# Patient Record
Sex: MALE | Race: WHITE | Employment: UNEMPLOYED | URBAN - METROPOLITAN AREA
[De-identification: names, ages, dates, MRNs, and addresses within clinical notes are randomized per-mention and may not be internally consistent; named-entity substitution may affect disease eponyms.]

---

## 2022-02-10 ENCOUNTER — APPOINTMENT (OUTPATIENT)
Dept: CT IMAGING | Age: 62
End: 2022-02-10
Attending: PHYSICIAN ASSISTANT
Payer: MEDICARE

## 2022-02-10 ENCOUNTER — HOSPITAL ENCOUNTER (EMERGENCY)
Age: 62
Discharge: ANOTHER HOSPITAL | End: 2022-02-10
Attending: STUDENT IN AN ORGANIZED HEALTH CARE EDUCATION/TRAINING PROGRAM
Payer: MEDICARE

## 2022-02-10 ENCOUNTER — APPOINTMENT (OUTPATIENT)
Dept: CT IMAGING | Age: 62
End: 2022-02-10
Attending: STUDENT IN AN ORGANIZED HEALTH CARE EDUCATION/TRAINING PROGRAM
Payer: MEDICARE

## 2022-02-10 ENCOUNTER — APPOINTMENT (OUTPATIENT)
Dept: GENERAL RADIOLOGY | Age: 62
End: 2022-02-10
Attending: STUDENT IN AN ORGANIZED HEALTH CARE EDUCATION/TRAINING PROGRAM
Payer: MEDICARE

## 2022-02-10 VITALS
RESPIRATION RATE: 19 BRPM | SYSTOLIC BLOOD PRESSURE: 81 MMHG | HEIGHT: 73 IN | DIASTOLIC BLOOD PRESSURE: 48 MMHG | BODY MASS INDEX: 23.71 KG/M2 | HEART RATE: 76 BPM | TEMPERATURE: 98.7 F | WEIGHT: 178.9 LBS | OXYGEN SATURATION: 99 %

## 2022-02-10 DIAGNOSIS — G40.901 STATUS EPILEPTICUS (HCC): Primary | ICD-10-CM

## 2022-02-10 DIAGNOSIS — R41.82 ALTERED MENTAL STATUS, UNSPECIFIED ALTERED MENTAL STATUS TYPE: ICD-10-CM

## 2022-02-10 DIAGNOSIS — N17.9 AKI (ACUTE KIDNEY INJURY) (HCC): ICD-10-CM

## 2022-02-10 LAB
AMPHET UR QL SCN: NEGATIVE
ANION GAP SERPL CALC-SCNC: 15 MMOL/L (ref 3–18)
APPEARANCE UR: CLEAR
APTT PPP: 24.5 SEC (ref 23–36.4)
ATRIAL RATE: 137 BPM
BACTERIA URNS QL MICRO: NEGATIVE /HPF
BARBITURATES UR QL SCN: NEGATIVE
BASOPHILS # BLD: 0 K/UL (ref 0–0.1)
BASOPHILS NFR BLD: 0 % (ref 0–2)
BENZODIAZ UR QL: NEGATIVE
BILIRUB UR QL: NEGATIVE
BUN SERPL-MCNC: 23 MG/DL (ref 7–18)
BUN/CREAT SERPL: 15 (ref 12–20)
CALCIUM SERPL-MCNC: 9.4 MG/DL (ref 8.5–10.1)
CALCULATED P AXIS, ECG09: 52 DEGREES
CALCULATED R AXIS, ECG10: 22 DEGREES
CALCULATED T AXIS, ECG11: 58 DEGREES
CANNABINOIDS UR QL SCN: NEGATIVE
CHLORIDE SERPL-SCNC: 107 MMOL/L (ref 100–111)
CO2 SERPL-SCNC: 19 MMOL/L (ref 21–32)
COCAINE UR QL SCN: NEGATIVE
COLOR UR: YELLOW
CREAT SERPL-MCNC: 1.57 MG/DL (ref 0.6–1.3)
DIAGNOSIS, 93000: NORMAL
DIFFERENTIAL METHOD BLD: ABNORMAL
EOSINOPHIL # BLD: 0.1 K/UL (ref 0–0.4)
EOSINOPHIL NFR BLD: 1 % (ref 0–5)
EPITH CASTS URNS QL MICRO: NORMAL /LPF (ref 0–5)
ERYTHROCYTE [DISTWIDTH] IN BLOOD BY AUTOMATED COUNT: 13.2 % (ref 11.6–14.5)
GLUCOSE BLD STRIP.AUTO-MCNC: 154 MG/DL (ref 70–110)
GLUCOSE SERPL-MCNC: 180 MG/DL (ref 74–99)
GLUCOSE UR STRIP.AUTO-MCNC: NEGATIVE MG/DL
HCT VFR BLD AUTO: 46.4 % (ref 36–48)
HDSCOM,HDSCOM: NORMAL
HGB BLD-MCNC: 14.9 G/DL (ref 13–16)
HGB UR QL STRIP: NEGATIVE
IMM GRANULOCYTES # BLD AUTO: 0.1 K/UL (ref 0–0.04)
IMM GRANULOCYTES NFR BLD AUTO: 1 % (ref 0–0.5)
INR PPP: 1.1 (ref 0.8–1.2)
KETONES UR QL STRIP.AUTO: 15 MG/DL
LEUKOCYTE ESTERASE UR QL STRIP.AUTO: NEGATIVE
LYMPHOCYTES # BLD: 2.6 K/UL (ref 0.9–3.6)
LYMPHOCYTES NFR BLD: 25 % (ref 21–52)
MCH RBC QN AUTO: 32.5 PG (ref 24–34)
MCHC RBC AUTO-ENTMCNC: 32.1 G/DL (ref 31–37)
MCV RBC AUTO: 101.3 FL (ref 78–100)
METHADONE UR QL: NEGATIVE
MONOCYTES # BLD: 0.8 K/UL (ref 0.05–1.2)
MONOCYTES NFR BLD: 8 % (ref 3–10)
NEUTS SEG # BLD: 6.5 K/UL (ref 1.8–8)
NEUTS SEG NFR BLD: 65 % (ref 40–73)
NITRITE UR QL STRIP.AUTO: NEGATIVE
NRBC # BLD: 0 K/UL (ref 0–0.01)
NRBC BLD-RTO: 0 PER 100 WBC
OPIATES UR QL: NEGATIVE
P-R INTERVAL, ECG05: 138 MS
PCP UR QL: NEGATIVE
PH UR STRIP: 5.5 [PH] (ref 5–8)
PLATELET # BLD AUTO: 169 K/UL (ref 135–420)
PMV BLD AUTO: 10 FL (ref 9.2–11.8)
POTASSIUM SERPL-SCNC: 3.5 MMOL/L (ref 3.5–5.5)
PROT UR STRIP-MCNC: ABNORMAL MG/DL
PROTHROMBIN TIME: 14 SEC (ref 11.5–15.2)
Q-T INTERVAL, ECG07: 288 MS
QRS DURATION, ECG06: 74 MS
QTC CALCULATION (BEZET), ECG08: 434 MS
RBC # BLD AUTO: 4.58 M/UL (ref 4.35–5.65)
RBC #/AREA URNS HPF: NORMAL /HPF (ref 0–5)
SODIUM SERPL-SCNC: 141 MMOL/L (ref 136–145)
SP GR UR REFRACTOMETRY: 1.03 (ref 1–1.03)
UROBILINOGEN UR QL STRIP.AUTO: 1 EU/DL (ref 0.2–1)
VENTRICULAR RATE, ECG03: 137 BPM
WBC # BLD AUTO: 10 K/UL (ref 4.6–13.2)
WBC URNS QL MICRO: NORMAL /HPF (ref 0–5)

## 2022-02-10 PROCEDURE — 70450 CT HEAD/BRAIN W/O DYE: CPT

## 2022-02-10 PROCEDURE — 96367 TX/PROPH/DG ADDL SEQ IV INF: CPT

## 2022-02-10 PROCEDURE — 93005 ELECTROCARDIOGRAM TRACING: CPT

## 2022-02-10 PROCEDURE — 85730 THROMBOPLASTIN TIME PARTIAL: CPT

## 2022-02-10 PROCEDURE — 74011000250 HC RX REV CODE- 250: Performed by: EMERGENCY MEDICINE

## 2022-02-10 PROCEDURE — 74011000636 HC RX REV CODE- 636: Performed by: STUDENT IN AN ORGANIZED HEALTH CARE EDUCATION/TRAINING PROGRAM

## 2022-02-10 PROCEDURE — 82962 GLUCOSE BLOOD TEST: CPT

## 2022-02-10 PROCEDURE — 71045 X-RAY EXAM CHEST 1 VIEW: CPT

## 2022-02-10 PROCEDURE — 74011000250 HC RX REV CODE- 250: Performed by: STUDENT IN AN ORGANIZED HEALTH CARE EDUCATION/TRAINING PROGRAM

## 2022-02-10 PROCEDURE — 96375 TX/PRO/DX INJ NEW DRUG ADDON: CPT

## 2022-02-10 PROCEDURE — 96365 THER/PROPH/DIAG IV INF INIT: CPT

## 2022-02-10 PROCEDURE — 96376 TX/PRO/DX INJ SAME DRUG ADON: CPT

## 2022-02-10 PROCEDURE — 96368 THER/DIAG CONCURRENT INF: CPT

## 2022-02-10 PROCEDURE — 80048 BASIC METABOLIC PNL TOTAL CA: CPT

## 2022-02-10 PROCEDURE — 85610 PROTHROMBIN TIME: CPT

## 2022-02-10 PROCEDURE — 74011250636 HC RX REV CODE- 250/636

## 2022-02-10 PROCEDURE — 80307 DRUG TEST PRSMV CHEM ANLYZR: CPT

## 2022-02-10 PROCEDURE — 99285 EMERGENCY DEPT VISIT HI MDM: CPT

## 2022-02-10 PROCEDURE — 74011000258 HC RX REV CODE- 258: Performed by: STUDENT IN AN ORGANIZED HEALTH CARE EDUCATION/TRAINING PROGRAM

## 2022-02-10 PROCEDURE — 74011250636 HC RX REV CODE- 250/636: Performed by: STUDENT IN AN ORGANIZED HEALTH CARE EDUCATION/TRAINING PROGRAM

## 2022-02-10 PROCEDURE — 74011250636 HC RX REV CODE- 250/636: Performed by: EMERGENCY MEDICINE

## 2022-02-10 PROCEDURE — 81001 URINALYSIS AUTO W/SCOPE: CPT

## 2022-02-10 PROCEDURE — 74011000258 HC RX REV CODE- 258: Performed by: EMERGENCY MEDICINE

## 2022-02-10 PROCEDURE — 70496 CT ANGIOGRAPHY HEAD: CPT

## 2022-02-10 PROCEDURE — 96366 THER/PROPH/DIAG IV INF ADDON: CPT

## 2022-02-10 PROCEDURE — 85025 COMPLETE CBC W/AUTO DIFF WBC: CPT

## 2022-02-10 RX ORDER — LORAZEPAM 2 MG/ML
4 INJECTION INTRAMUSCULAR ONCE
Status: COMPLETED | OUTPATIENT
Start: 2022-02-10 | End: 2022-02-10

## 2022-02-10 RX ORDER — LORAZEPAM 2 MG/ML
INJECTION INTRAMUSCULAR
Status: DISCONTINUED
Start: 2022-02-10 | End: 2022-02-11 | Stop reason: HOSPADM

## 2022-02-10 RX ORDER — LORAZEPAM 2 MG/ML
1 INJECTION INTRAMUSCULAR ONCE
Status: COMPLETED | OUTPATIENT
Start: 2022-02-10 | End: 2022-02-10

## 2022-02-10 RX ORDER — VALPROATE SODIUM 100 MG/ML
20 INJECTION INTRAVENOUS
Status: DISCONTINUED | OUTPATIENT
Start: 2022-02-10 | End: 2022-02-11 | Stop reason: HOSPADM

## 2022-02-10 RX ORDER — RIVAROXABAN 15 MG/1
15 TABLET, FILM COATED ORAL
COMMUNITY
Start: 2022-02-02

## 2022-02-10 RX ORDER — LORAZEPAM 2 MG/ML
INJECTION INTRAMUSCULAR
Status: COMPLETED
Start: 2022-02-10 | End: 2022-02-10

## 2022-02-10 RX ORDER — TAMSULOSIN HYDROCHLORIDE 0.4 MG/1
0.4 CAPSULE ORAL DAILY
COMMUNITY
Start: 2021-12-13

## 2022-02-10 RX ORDER — PHENOBARBITAL SODIUM 65 MG/ML
10 INJECTION INTRAMUSCULAR
Status: COMPLETED | OUTPATIENT
Start: 2022-02-10 | End: 2022-02-10

## 2022-02-10 RX ORDER — FOLIC ACID 1 MG/1
1 TABLET ORAL DAILY
COMMUNITY
Start: 2021-11-09

## 2022-02-10 RX ORDER — ONDANSETRON 2 MG/ML
4 INJECTION INTRAMUSCULAR; INTRAVENOUS
Status: COMPLETED | OUTPATIENT
Start: 2022-02-10 | End: 2022-02-10

## 2022-02-10 RX ORDER — ONDANSETRON 2 MG/ML
INJECTION INTRAMUSCULAR; INTRAVENOUS
Status: DISCONTINUED
Start: 2022-02-10 | End: 2022-02-11 | Stop reason: HOSPADM

## 2022-02-10 RX ADMIN — VALPROATE SODIUM 1622 MG: 100 INJECTION, SOLUTION INTRAVENOUS at 19:56

## 2022-02-10 RX ADMIN — LORAZEPAM 4 MG: 2 INJECTION INTRAMUSCULAR; INTRAVENOUS at 19:41

## 2022-02-10 RX ADMIN — PROMETHAZINE HYDROCHLORIDE 12.5 MG: 25 INJECTION INTRAMUSCULAR; INTRAVENOUS at 21:26

## 2022-02-10 RX ADMIN — DEXMEDETOMIDINE HYDROCHLORIDE 0.4 MCG/KG/HR: 100 INJECTION, SOLUTION INTRAVENOUS at 21:10

## 2022-02-10 RX ADMIN — LORAZEPAM 4 MG: 2 INJECTION INTRAMUSCULAR at 19:41

## 2022-02-10 RX ADMIN — PHENOBARBITAL SODIUM 811.2 MG: 65 INJECTION INTRAMUSCULAR at 22:06

## 2022-02-10 RX ADMIN — ONDANSETRON 4 MG: 2 INJECTION INTRAMUSCULAR; INTRAVENOUS at 20:52

## 2022-02-10 RX ADMIN — LORAZEPAM 1 MG: 2 INJECTION INTRAMUSCULAR at 18:56

## 2022-02-10 RX ADMIN — SODIUM CHLORIDE 1000 ML: 900 INJECTION, SOLUTION INTRAVENOUS at 22:32

## 2022-02-10 RX ADMIN — LEVETIRACETAM 3500 MG: 100 INJECTION, SOLUTION INTRAVENOUS at 21:10

## 2022-02-10 RX ADMIN — IOPAMIDOL 80 ML: 755 INJECTION, SOLUTION INTRAVENOUS at 20:03

## 2022-02-10 NOTE — ED TRIAGE NOTES
Pt was brought to the ER and at this time he is unresponsive and deviated to the left side. Code S called and pt is in CT at this time. Information obtained by the brother.

## 2022-02-11 ENCOUNTER — HOSPITAL ENCOUNTER (INPATIENT)
Age: 62
LOS: 5 days | Discharge: NURSING, REHAB OR LONG-TERM CARE | DRG: 101 | End: 2022-02-16
Attending: INTERNAL MEDICINE | Admitting: HOSPITALIST
Payer: MEDICARE

## 2022-02-11 ENCOUNTER — APPOINTMENT (OUTPATIENT)
Dept: MRI IMAGING | Age: 62
DRG: 101 | End: 2022-02-11
Attending: NURSE PRACTITIONER
Payer: MEDICARE

## 2022-02-11 DIAGNOSIS — G40.901 STATUS EPILEPTICUS (HCC): ICD-10-CM

## 2022-02-11 DIAGNOSIS — R56.1 POST TRAUMATIC SEIZURE DISORDER (HCC): ICD-10-CM

## 2022-02-11 DIAGNOSIS — I69.398 EPILEPSY AFTER STROKE (HCC): ICD-10-CM

## 2022-02-11 DIAGNOSIS — G40.909 EPILEPSY AFTER STROKE (HCC): ICD-10-CM

## 2022-02-11 LAB
ALBUMIN SERPL-MCNC: 3.3 G/DL (ref 3.5–5)
ALBUMIN/GLOB SERPL: 1.1 {RATIO} (ref 1.1–2.2)
ALP SERPL-CCNC: 77 U/L (ref 45–117)
ALT SERPL-CCNC: 33 U/L (ref 12–78)
ANION GAP SERPL CALC-SCNC: 3 MMOL/L (ref 5–15)
AST SERPL-CCNC: 20 U/L (ref 15–37)
BILIRUB DIRECT SERPL-MCNC: 0.2 MG/DL (ref 0–0.2)
BILIRUB SERPL-MCNC: 1 MG/DL (ref 0.2–1)
BUN SERPL-MCNC: 18 MG/DL (ref 6–20)
BUN/CREAT SERPL: 18 (ref 12–20)
CALCIUM SERPL-MCNC: 8.5 MG/DL (ref 8.5–10.1)
CHLORIDE SERPL-SCNC: 113 MMOL/L (ref 97–108)
CO2 SERPL-SCNC: 25 MMOL/L (ref 21–32)
CREAT SERPL-MCNC: 1.02 MG/DL (ref 0.7–1.3)
ERYTHROCYTE [DISTWIDTH] IN BLOOD BY AUTOMATED COUNT: 13.3 % (ref 11.5–14.5)
GLOBULIN SER CALC-MCNC: 3 G/DL (ref 2–4)
GLUCOSE SERPL-MCNC: 95 MG/DL (ref 65–100)
HCT VFR BLD AUTO: 39.7 % (ref 36.6–50.3)
HGB BLD-MCNC: 12.8 G/DL (ref 12.1–17)
MAGNESIUM SERPL-MCNC: 2.5 MG/DL (ref 1.6–2.4)
MCH RBC QN AUTO: 32.5 PG (ref 26–34)
MCHC RBC AUTO-ENTMCNC: 32.2 G/DL (ref 30–36.5)
MCV RBC AUTO: 100.8 FL (ref 80–99)
NRBC # BLD: 0 K/UL (ref 0–0.01)
NRBC BLD-RTO: 0 PER 100 WBC
PLATELET # BLD AUTO: 112 K/UL (ref 150–400)
PMV BLD AUTO: 10.3 FL (ref 8.9–12.9)
POTASSIUM SERPL-SCNC: 4.7 MMOL/L (ref 3.5–5.1)
PROT SERPL-MCNC: 6.3 G/DL (ref 6.4–8.2)
RBC # BLD AUTO: 3.94 M/UL (ref 4.1–5.7)
SODIUM SERPL-SCNC: 141 MMOL/L (ref 136–145)
VALPROATE SERPL-MCNC: 52 UG/ML (ref 50–100)
WBC # BLD AUTO: 13.2 K/UL (ref 4.1–11.1)

## 2022-02-11 PROCEDURE — 80076 HEPATIC FUNCTION PANEL: CPT

## 2022-02-11 PROCEDURE — 74011250637 HC RX REV CODE- 250/637: Performed by: NURSE PRACTITIONER

## 2022-02-11 PROCEDURE — 77010033678 HC OXYGEN DAILY

## 2022-02-11 PROCEDURE — 74011250636 HC RX REV CODE- 250/636: Performed by: NURSE PRACTITIONER

## 2022-02-11 PROCEDURE — 74011250636 HC RX REV CODE- 250/636

## 2022-02-11 PROCEDURE — 80048 BASIC METABOLIC PNL TOTAL CA: CPT

## 2022-02-11 PROCEDURE — 36415 COLL VENOUS BLD VENIPUNCTURE: CPT

## 2022-02-11 PROCEDURE — A9576 INJ PROHANCE MULTIPACK: HCPCS

## 2022-02-11 PROCEDURE — 65610000006 HC RM INTENSIVE CARE

## 2022-02-11 PROCEDURE — 74011000250 HC RX REV CODE- 250: Performed by: NURSE PRACTITIONER

## 2022-02-11 PROCEDURE — 74011250636 HC RX REV CODE- 250/636: Performed by: INTERNAL MEDICINE

## 2022-02-11 PROCEDURE — 70553 MRI BRAIN STEM W/O & W/DYE: CPT

## 2022-02-11 PROCEDURE — 85027 COMPLETE CBC AUTOMATED: CPT

## 2022-02-11 PROCEDURE — 95816 EEG AWAKE AND DROWSY: CPT | Performed by: NURSE PRACTITIONER

## 2022-02-11 PROCEDURE — 74011000258 HC RX REV CODE- 258: Performed by: NURSE PRACTITIONER

## 2022-02-11 PROCEDURE — 80164 ASSAY DIPROPYLACETIC ACD TOT: CPT

## 2022-02-11 PROCEDURE — APPNB45 APP NON BILLABLE 31-45 MINUTES: Performed by: NURSE PRACTITIONER

## 2022-02-11 PROCEDURE — 99223 1ST HOSP IP/OBS HIGH 75: CPT | Performed by: PSYCHIATRY & NEUROLOGY

## 2022-02-11 PROCEDURE — 83735 ASSAY OF MAGNESIUM: CPT

## 2022-02-11 RX ORDER — HYDROCORTISONE 1 %
CREAM (GRAM) TOPICAL EVERY 12 HOURS
Status: DISCONTINUED | OUTPATIENT
Start: 2022-02-11 | End: 2022-02-16 | Stop reason: HOSPADM

## 2022-02-11 RX ORDER — ENOXAPARIN SODIUM 100 MG/ML
1 INJECTION SUBCUTANEOUS EVERY 12 HOURS
Status: DISCONTINUED | OUTPATIENT
Start: 2022-02-11 | End: 2022-02-16 | Stop reason: HOSPADM

## 2022-02-11 RX ORDER — SODIUM CHLORIDE 0.9 % (FLUSH) 0.9 %
5-40 SYRINGE (ML) INJECTION AS NEEDED
Status: DISCONTINUED | OUTPATIENT
Start: 2022-02-11 | End: 2022-02-16 | Stop reason: HOSPADM

## 2022-02-11 RX ORDER — SODIUM CHLORIDE, SODIUM LACTATE, POTASSIUM CHLORIDE, CALCIUM CHLORIDE 600; 310; 30; 20 MG/100ML; MG/100ML; MG/100ML; MG/100ML
100 INJECTION, SOLUTION INTRAVENOUS CONTINUOUS
Status: DISCONTINUED | OUTPATIENT
Start: 2022-02-11 | End: 2022-02-12

## 2022-02-11 RX ORDER — LORAZEPAM 2 MG/ML
1 INJECTION INTRAMUSCULAR ONCE
Status: COMPLETED | OUTPATIENT
Start: 2022-02-11 | End: 2022-02-11

## 2022-02-11 RX ORDER — ACETAMINOPHEN 325 MG/1
650 TABLET ORAL
Status: DISCONTINUED | OUTPATIENT
Start: 2022-02-11 | End: 2022-02-16 | Stop reason: HOSPADM

## 2022-02-11 RX ORDER — FENTANYL CITRATE 50 UG/ML
50 INJECTION, SOLUTION INTRAMUSCULAR; INTRAVENOUS ONCE
Status: COMPLETED | OUTPATIENT
Start: 2022-02-11 | End: 2022-02-11

## 2022-02-11 RX ORDER — LORAZEPAM 2 MG/ML
4 INJECTION INTRAMUSCULAR
Status: DISCONTINUED | OUTPATIENT
Start: 2022-02-11 | End: 2022-02-16 | Stop reason: HOSPADM

## 2022-02-11 RX ORDER — BISMUTH SUBSALICYLATE 262 MG
1 TABLET,CHEWABLE ORAL DAILY
COMMUNITY

## 2022-02-11 RX ORDER — LORAZEPAM 2 MG/ML
INJECTION INTRAMUSCULAR
Status: COMPLETED
Start: 2022-02-11 | End: 2022-02-11

## 2022-02-11 RX ORDER — SODIUM CHLORIDE 0.9 % (FLUSH) 0.9 %
5-40 SYRINGE (ML) INJECTION EVERY 8 HOURS
Status: DISCONTINUED | OUTPATIENT
Start: 2022-02-11 | End: 2022-02-16 | Stop reason: HOSPADM

## 2022-02-11 RX ORDER — FENTANYL CITRATE 50 UG/ML
INJECTION, SOLUTION INTRAMUSCULAR; INTRAVENOUS
Status: COMPLETED
Start: 2022-02-11 | End: 2022-02-11

## 2022-02-11 RX ORDER — LATANOPROST 50 UG/ML
1 SOLUTION/ DROPS OPHTHALMIC
Status: DISCONTINUED | OUTPATIENT
Start: 2022-02-11 | End: 2022-02-16 | Stop reason: HOSPADM

## 2022-02-11 RX ADMIN — SODIUM CHLORIDE, PRESERVATIVE FREE 10 ML: 5 INJECTION INTRAVENOUS at 15:17

## 2022-02-11 RX ADMIN — LORAZEPAM 1 MG: 2 INJECTION INTRAMUSCULAR; INTRAVENOUS at 18:30

## 2022-02-11 RX ADMIN — PHENYLEPHRINE HYDROCHLORIDE 30 MCG/MIN: 10 INJECTION INTRAVENOUS at 10:30

## 2022-02-11 RX ADMIN — ENOXAPARIN SODIUM 80 MG: 100 INJECTION SUBCUTANEOUS at 08:55

## 2022-02-11 RX ADMIN — PHENYLEPHRINE HYDROCHLORIDE 20 MCG/MIN: 10 INJECTION INTRAVENOUS at 21:12

## 2022-02-11 RX ADMIN — FENTANYL CITRATE 50 MCG: 50 INJECTION, SOLUTION INTRAMUSCULAR; INTRAVENOUS at 19:35

## 2022-02-11 RX ADMIN — LEVETIRACETAM 1000 MG: 100 INJECTION, SOLUTION INTRAVENOUS at 08:55

## 2022-02-11 RX ADMIN — HYDROCORTISONE: 1 CREAM TOPICAL at 13:00

## 2022-02-11 RX ADMIN — SODIUM CHLORIDE, POTASSIUM CHLORIDE, SODIUM LACTATE AND CALCIUM CHLORIDE 100 ML/HR: 600; 310; 30; 20 INJECTION, SOLUTION INTRAVENOUS at 18:19

## 2022-02-11 RX ADMIN — SODIUM CHLORIDE, POTASSIUM CHLORIDE, SODIUM LACTATE AND CALCIUM CHLORIDE 500 ML: 600; 310; 30; 20 INJECTION, SOLUTION INTRAVENOUS at 03:08

## 2022-02-11 RX ADMIN — HYDROCORTISONE: 1 CREAM TOPICAL at 21:15

## 2022-02-11 RX ADMIN — LEVETIRACETAM 1000 MG: 100 INJECTION, SOLUTION INTRAVENOUS at 21:13

## 2022-02-11 RX ADMIN — SODIUM CHLORIDE, POTASSIUM CHLORIDE, SODIUM LACTATE AND CALCIUM CHLORIDE 100 ML/HR: 600; 310; 30; 20 INJECTION, SOLUTION INTRAVENOUS at 12:40

## 2022-02-11 RX ADMIN — SODIUM CHLORIDE, PRESERVATIVE FREE 10 ML: 5 INJECTION INTRAVENOUS at 01:36

## 2022-02-11 RX ADMIN — ENOXAPARIN SODIUM 80 MG: 100 INJECTION SUBCUTANEOUS at 21:14

## 2022-02-11 RX ADMIN — SODIUM CHLORIDE, POTASSIUM CHLORIDE, SODIUM LACTATE AND CALCIUM CHLORIDE 50 ML/HR: 600; 310; 30; 20 INJECTION, SOLUTION INTRAVENOUS at 01:34

## 2022-02-11 RX ADMIN — SODIUM CHLORIDE, PRESERVATIVE FREE 10 ML: 5 INJECTION INTRAVENOUS at 05:49

## 2022-02-11 RX ADMIN — SODIUM CHLORIDE, PRESERVATIVE FREE 10 ML: 5 INJECTION INTRAVENOUS at 22:00

## 2022-02-11 RX ADMIN — GADOTERIDOL 15 ML: 279.3 INJECTION, SOLUTION INTRAVENOUS at 19:47

## 2022-02-11 NOTE — ED PROVIDER NOTES
7:45 PM  Pt accepted at turnover from Dr. Tyra Peoples. New onset Seizure with Status epilepticus requiring large doses of benzodiazipines. CT Head negative, Pending CTA  Needs continuous EEG,  [place] on diversion at this time trying to find accepting facility     9:40 PM  Patient Accepted at [facility] in the ICU via [address] and Neurology  Patient continued to have agitiation and seizure like activity. He was started on Precedex titratable drip. In addition pt having nausea and vomiting. Given Zofran and Promethazine. As increasing dose of medications with sedative properties are being used airway is an ever present concern. Upon my evaluation, this patient had a high probability of imminent or life-threatening deterioration due to CNS compromise and altered mental status with worry over airway involvement and status epilepticus , which required my direct attention, intervention, and personal management. I have personally provided 30 minutes of critical care time exclusive of time spent on separately billable procedures. Time includes review of laboratory data, radiology results, discussion with consultants, and monitoring for potential decompensation. Interventions were performed as documented above.  Critical Care is a continuation from previous physician critical care    10:38 PM  Patient continued to exhibit seizure like activity and agitation will add phenobarbital loading does    11:10 PM  Transferred to [facility]

## 2022-02-11 NOTE — CONSULTS
Neurology Consult  Courtney Gaffney NP    Patient: Maximino Robertson MRN: [id number]  SSN: [id number]    YOB: 1960  Age: 64 y.o. Sex: male      Chief Complaint: AMS, unresponsiveness, left gaze deviation, seizure-like activity    Subjective:      Maximino Robertson is a 64 y.o. M with a past medical history of CVAs with no residual deficits, seizures (not on any AED for 10 years) and VTE on Xarelto who presented to the ER at [facility] via private vehicle on the evening of 02/10/22 unresponsive with left gaze deviation not moving right side. According to ER record, his brother who brought him to the hospital stated he ahd a tonic-clonic type seizure in the car prior to getting to the hospital. His mental status had been declining since 5:30pm that day. A Code Stroke was called on arrival to the ER and the patient was taken for [facility] which showed no acute intracranial abnormalities but encephalomalacia in bilateral frontal lobes with significant small vessel ischemic disease. While on the CT table patient had another seizure that lasted less than a minute. 1 mg IV Ativan was given. Patient saw the Teleneurologist and it was recommended that he be loaded with Valproic acid. He was taken back for CTA H/N which showed no LVO on preliminary report. While in CT he had another seizure. He was then given 4 mg IV Ativan. He was given a load of valproate 1622 mg IV and  Keppra 3500 mg IV. He continued to exhibit seizure-like activity so phenobarbital 811.2 mg IV was added. It was recommended that patient be transferred to [facility] ICU for close monitoring, Neurology consult and continuous EEG. Past Medical History:   Diagnosis Date    H/O blood clots     Seizures ([facility] [address]     Stroke ([facility] [address]      No family history on file.   Social History     Tobacco Use    Smoking status: Not on file    Smokeless tobacco: Not on file   Substance Use Topics    Alcohol use: Not on file      Prior to Admission Medications   Prescriptions Last Dose Informant Patient Reported? Taking? Xarelto 15 mg tab tablet   Yes No   folic acid (FOLVITE) 1 mg tablet   Yes No   Sig: Take 1 mg by mouth daily. tamsulosin (FLOMAX) 0.4 mg capsule   Yes No   Sig: Take 0.4 mg by mouth daily. Facility-Administered Medications: None       No Known Allergies    Review of Systems:  Review of systems not obtained due to patient factors. Unresponsive      Objective: There were no vitals filed for this visit. Physical Exam:  GENERAL: Unresponsive, VSS  SKIN: Warm, dry, color appropriate for ethnicity. Neurologic Exam:  Mental Status:  No eye opening, no command following     Language:    Mute, unresponsive    Cranial Nerves:   Pupils 2 mm, equal, round and reactive to light. No blink to threat     No asymmetry noted at rest. Edentulous          Motor:         Bulk and tone normal.      No spontaneous movement noted     No involuntary movements. Sensation:    Withdraws from noxious stimuli x 4. Reflexes:     Negative Babinskis    Labs:  Lab Results   Component Value Date/Time    WBC 10.0 02/10/2022 06:38 PM    HGB 14.9 02/10/2022 06:38 PM    HCT 46.4 02/10/2022 06:38 PM    PLATELET 765 86/23/9773 06:38 PM    .3 (H) 02/10/2022 06:38 PM      Lab Results   Component Value Date/Time    Sodium 141 02/10/2022 06:38 PM    Potassium 3.5 02/10/2022 06:38 PM    Chloride 107 02/10/2022 06:38 PM    CO2 19 (L) 02/10/2022 06:38 PM    Anion gap 15 02/10/2022 06:38 PM    Glucose 180 (H) 02/10/2022 06:38 PM    BUN 23 (H) 02/10/2022 06:38 PM    Creatinine 1.57 (H) 02/10/2022 06:38 PM    BUN/Creatinine ratio 15 02/10/2022 06:38 PM    GFR est AA 55 (L) 02/10/2022 06:38 PM    GFR est non-AA 45 (L) 02/10/2022 06:38 PM    Calcium 9.4 02/10/2022 06:38 PM     No results found for: CPK, RCK1, RCK2, RCK3, RCK4, CKMB, CKNDX, CKND1, TROPT, TROIQ, BNPP, BNP    Imaging:  CT Results (maximum last 3):   Results from [place] encounter on 02/10/22    CT HEAD WO CONT    Narrative  EXAM: CT head    INDICATION: Stroke alert, CODE S, left-sided deviation, unresponsive    COMPARISON: March 22, 2006    TECHNIQUE: Axial CT imaging of the head was performed without intravenous  contrast. One or more dose reduction techniques were used on this CT: automated  exposure control, adjustment of the mAs and/or kVp according to patient size,  and iterative reconstruction techniques. The specific techniques used on this  CT exam have been documented in the patient's electronic medical record. Digital  Imaging and Communications in Medicine (DICOM) format image data are available  to nonaffiliated external healthcare facilities or entities on a secure, media  free, reciprocally searchable basis with patient authorization for at least a  12-month period after this study. _______________    FINDINGS:    BRAIN AND POSTERIOR FOSSA: The sulci, folia, ventricles and basal cisterns are  within normal limits for the patient's age. There is no intracranial hemorrhage,  mass effect, or midline shift. There is left frontal lobe encephalomalacia. There is encephalomalacia of the right frontal lobe. There is significant small  vessel ischemic disease. EXTRA-AXIAL SPACES AND MENINGES: There are no abnormal extra-axial fluid  collections. CALVARIUM: There is a left frontal craniotomy flap. SINUSES: Clear. OTHER: None.    _______________    Impression  No acute intracranial abnormalities. Encephalomalacia both frontal lobes and significant small vessel ischemic  disease. Dr. Pipe Dodd informed 7:02 PM February 10, 2022.       Results from [place] encounter on 12/04/10    CTA CHEST W AND W/O CONTRAST    Narrative  Ordering MD: Larry Bhatt MD  Interpreted By: Talia Le MD  ** FINAL ADDENDUM **  ---------------------------------------------------------------------   Armida Ibarra  [id number] addendum:  The sagittal and coronal reconstructions were performed as  slab MIP  reconstructions. END OF ADDENDUM   PROCEDURE DATE:  Dec  4 2010  9:30PM    Accession Number:  [id number]  Order No:   [id number]  Procedure:   CTS - CTA CHEST W/WO CONTRAST  CPT Code:   87685  Reason:   SHORTNESS OF BREATH  INTERPRETATION:  INDICATION: Shortness of breath, right leg pain and swelling  Findings:  Isovolumetric imaging was performed through the thorax  during pulmonary arterial phase of nonionic contrast enhancement. Thin section axial, coronal and sagittal reconstructions were  created to optimally display the pulmonary arterial vasculature. Nonocclusive filling defect is present in the main pulmonary  arteries bilaterally, extending into upper lobe and lower lobe  pulmonary arteries on the left, becoming occlusive in the anterior  segment left upper lobe pulmonary artery, and nonocclusive extension  into anterior basal and posterior basal left lower lobe segmental  and subsegmental pulmonary arteries. Embolus on the right is  somewhat more prominent, nearly occlusive in the distal main  pulmonary artery, extending into the upper lobe and intermedius  pulmonary artery, apical and anterior segmental pulmonary arteries  in the right upper lobe, right middle lobe pulmonary artery,  segmental pulmonary arteries to the anterior basal, medial basal and  posterior basal segments of the right lower lobe. (Critical results)  No dilatation of the right ventricle, no evidence of elevated right  heart pressures. Two small rounded foci of consolidation are present  inferiorly in the posterior right lower lobe. Lungs are otherwise  clear. No pleural  effusions. Small pericardial effusion is noted. No pathological osseous lesions. IMPRESSION:  1. Moderately prominent burden of bilateral pulmonary embolization,  including emboli in the main pulmonary arteries, and segmental  pulmonary arteries to essentially all lobes.  No evidence of  significant right heart strain. 2. Small rounded focal consolidations in the inferior right lung,  possibly pulmonary infarcts. Preliminary interpretation provided by [facility] at the time of the study. [facility] radiologist telephoned to the report to Dr. Jackie Schafer at 9:58 p.m.  on 10/04/2010. Assessment:     Hospital Problems  Never Reviewed          Codes Class Noted POA    Status epilepticus ([facility] [address] ICD-10-CM: G40.901  ICD-9-CM: 345.3  2/11/2022 Unknown            Plan:     Status epilepticus.    - cEEG stat to rule out nonconvulsive status. cEEG attached. No seizure seen. Will continue EEG. -Loaded with  valproate 1622 mg IV and  Keppra 3500 mg IV and phenobarbital 811.2 mg IV in ER   - Cont AED regimen: Keppra 1000 mg IV bid for now   -Depakote level    - CT head showed no acute intracranial abnormalities but encephalomalacia in  bilateral frontal  lobes with significant small vessel ischemic disease. Left craniotomy flap from previous sx. -CTA H/N -Preliminary report from Dr. Abdullahi Sheets showed no LVO. Had to call Radiology to get this  information.    - Ativan for seizure activity lasting longer than 5 minutes   - Avoid fluoroquinolones and 4th generation cephalosporins as can lower seizure  threshold   - Seizure precautions        I have discussed the diagnosis and the intended plan as seen in the above orders with Intensivist and the primary RN. Further recommendations to follow from Dr. Jean Guzmán. Thank you for this consult and participating in the care of this patient.   Signed By: Humberto Lobo NP     February 11, 2022

## 2022-02-11 NOTE — ED NOTES
Report given to Esme ESPINAL at [facility] ICU. Transport ETA within an hour. Brother, Jimbo Sauceda, is leaving with with patient's belongings.  Jonathan Hernandezl: [phone]

## 2022-02-11 NOTE — WOUND CARE
Wound Care Note:     New consult placed by NP request for bilateral ankle skin rash/condition    Chart shows:  Admitted for status epilepitcus   Past Medical History:   Diagnosis Date    H/O blood clots     [facility] [address]     Stroke ([facility] [address]      WBC = 13.2 on 2/11/22  Admitted from [facility]    Assessment:   Patient did not wake up during assessment, incontinent with moderate assistance needed in repositioning. Bed: In Touch [place]  Patient has a Arevalo. Diet: NPO  Patient did not grimace or moan with repositioning. Bilateral heels, buttocks, and sacral skin intact and without erythema. Palpable DP pulses bilaterally. 1. POA right medial ankle with pink, dry, rash with central portion being purple, all areas are non-blanchable, no open area, pink tissue measures 13 cm x 6 cm with purple central area measuring 4.5 cm x 2 cm. 2.  POA left medial ankle looks the same as right, pink tissue measures 13 cm x 7 cm with purple area measuring 7 cm x 4 cm. Nurse talked with brother, advised they are venous ulcers, MD requested support stockings and hydrocortisone cream.  Orders will be placed. 3.  POA right dorsal foot with red area measuring 0.3 cm x 0.8 cm x 0 cm, no open area, probably trauma related, no drainage, biju-wound intact. Left open to air. Spoke with July Stevens NP, wound care orders obtained. Patient repositioned on left side. Recommendations:    Bilateral medial ankles- Every 12 hours apply hydrocortisone cream and reapply Tubigrip. Skin Care & Pressure Prevention:  Minimize layers of linen/pads under patient to optimize support surface. Turn/reposition approximately every 2 hours and offload heels.   Manage incontinence / promote continence   Nourishing Skin Cream to dry skin, minimize use of briefs when able    Discussed above plan with patient & James Fuller RN    Transition of Care: Plan to follow as needed while admitted to hospital.    LILLY MotaN, RN, [facility], [facility].  office [phone]  pager [phone] or call  to page

## 2022-02-11 NOTE — ED NOTES
Dentures removed from patient's mouth and given to family member. Patient transported to CT and had 30-35 second seizure during scan. 4mg ativan administered and scan completed. Patient transported back to ED.

## 2022-02-11 NOTE — PROGRESS NOTES
Reviewed chart for transitions of care, and discussed in rounds. CM spoke with patient's brother Rene Cuba to introduce self and explain role, confirmed demographics. Baseline:   ADLs/IDALS:Independent  Previous Home Health:None  Previous SNF/IPR:None  ER Contact: Yokasta Zepeda [phone]    Patient lives in a 2 level house with 4 steps to enter. Patient is independent with ADLs/IDALs. Patient has no previous HH or equipment needs. Patient's preferred pharmacy is [facility]  located on [facility]   Transportation TBD    Reason for Admission:  AMS, Seizure                     RUR Score:   9%                  Plan for utilizing home health:  TBD        PCP: First and Last name:   Name of Practice: [facility]   Are you a current patient: Yes/No: Yes   Approximate date of last visit:    Can you participate in a virtual visit with your PCP: No                    Current Advanced Directive/Advance Care Plan: Full Code      Healthcare Decision Maker:   Click here to complete [address] including selection of the Healthcare Decision Maker Relationship (ie \"Primary\")                             Transition of Care Plan:    Care manager spoke with patient's brother Rene Cuba to introduce self and explain role. Patient lives independently with his brother Ed. Per Ed patient does not drive. Patient is seen at [facility] and rotates being seen by the MD's there. Per brother patient has Medicare A,B. Care management will follow for transitions of care. Humberto Bartholomew RN,Care Management      Care Management Interventions  PCP Verified by CM:  Yes ([facility])  MyChart Signup: No  Discharge Durable Medical Equipment: No  Physical Therapy Consult: No  Occupational Therapy Consult: No  Speech Therapy Consult: No  Support Systems: Other Family Member(s) (Brother Ed Avril Medeiros )

## 2022-02-11 NOTE — ED PROVIDER NOTES
EMERGENCY DEPARTMENT HISTORY AND PHYSICAL EXAM      Date: 2/10/2022  Patient Name: Delano Steven    History of Presenting Illness     Chief Complaint   Patient presents with   Nicolasukhi Conradmartin Unresponsive       History (Context): Delano Steven is a 64 y.o. male with a past medical history significant for seizures not currently on any antiepileptic medication, CVA, VTE currently on Xarelto comes into the ED today due to altered mental status. Patient last known well at 5:30 PM.  Patient brought to the emergency department by his brother. Brother states that he has been having worsening altered mental status since 530 and approximately 10 minutes prior to arrival had generalized tonic-clonic like activity in the car. Patient following found to be unresponsive, with eyes deviated to the left and not moving the right upper and lower extremity. Per brother patient had been feeling well prior to this without any reported symptoms. Mother does state patient without any significant residual deficits following his previous strokes in the past.  History and review of systems limited secondary to altered mental status. PCP: No primary care provider on file. Current Facility-Administered Medications   Medication Dose Route Frequency Provider Last Rate Last Admin    LORazepam (ATIVAN) 2 mg/mL injection             valproate (DEPACON) 500 mg/5 mL (100 mg/mL) injection 1,622 mg  20 mg/kg IntraVENous NOW Carlene Dux, DO        iopamidoL (ISOVUE-370) 76 % injection 100 mL  100 mL IntraVENous RAD ONCE Carlene Dux, DO         Current Outpatient Medications   Medication Sig Dispense Refill    folic acid (FOLVITE) 1 mg tablet Take 1 mg by mouth daily.  Xarelto 15 mg tab tablet       tamsulosin (FLOMAX) 0.4 mg capsule Take 0.4 mg by mouth daily.          Past History     Past Medical History:   Past Medical History:   Diagnosis Date    H/O blood clots     Seizures ([address]     Stroke [facility])        Past Surgical History:  No past surgical history on file. Family History:  No family history on file. Social History:   Social History     Tobacco Use    Smoking status: Not on file    Smokeless tobacco: Not on file   Substance Use Topics    Alcohol use: Not on file    Drug use: Not on file     Unknown    Allergies:  No Known Allergies    PMH, PSH, family history, social history, allergies reviewed with the patient with significant items noted above. Review of Systems   Review of Systems   Unable to perform ROS: Patient unresponsive   Neurological: Positive for seizures. AMS       Physical Exam     Vitals:    02/10/22 1838   BP: (!) 142/73   Pulse: (!) 137   Resp: 18   Temp: 98.7 °F (37.1 °C)   SpO2: 98%   Weight: 81.1 kg (178 lb 14.4 oz)       Physical Exam  Vitals and nursing note reviewed. Constitutional:       General: He is in acute distress. Appearance: He is not ill-appearing. Comments: Not responding to voice. HENT:      Head: Normocephalic and atraumatic. Mouth/Throat:      Mouth: Mucous membranes are moist.   Eyes:      General: No scleral icterus. Conjunctiva/sclera: Conjunctivae normal.   Cardiovascular:      Rate and Rhythm: Regular rhythm. Tachycardia present. Pulmonary:      Effort: Pulmonary effort is normal. No respiratory distress. Comments: Tachypnea with intermittent sonorous respirations  Abdominal:      General: There is no distension. Palpations: Abdomen is soft. Tenderness: There is no abdominal tenderness. Musculoskeletal:         General: No deformity. Normal range of motion. Cervical back: Normal range of motion and neck supple. Skin:     General: Skin is warm and dry. Findings: No rash. Neurological:      Mental Status: He is alert.       Comments: Patient with left gaze deviation, pupils 2 mm and sluggishly reactive, moving the left upper and lower extremities spontaneously, not moving the right upper or lower extremities to pain or other  stimuli. Patient with rapid shallow breathing         Diagnostic Study Results     Labs -     Recent Results (from the past 12 hour(s))   METABOLIC PANEL, BASIC    Collection Time: 02/10/22  6:38 PM   Result Value Ref Range    Sodium 141 136 - 145 mmol/L    Potassium 3.5 3.5 - 5.5 mmol/L    Chloride 107 100 - 111 mmol/L    CO2 19 (L) 21 - 32 mmol/L    Anion gap 15 3.0 - 18 mmol/L    Glucose 180 (H) 74 - 99 mg/dL    BUN 23 (H) 7.0 - 18 MG/DL    Creatinine 1.57 (H) 0.6 - 1.3 MG/DL    BUN/Creatinine ratio 15 12 - 20      GFR est AA 55 (L) >60 ml/min/1.73m2    GFR est non-AA 45 (L) >60 ml/min/1.73m2    Calcium 9.4 8.5 - 10.1 MG/DL   CBC WITH AUTOMATED DIFF    Collection Time: 02/10/22  6:38 PM   Result Value Ref Range    WBC 10.0 4.6 - 13.2 K/uL    RBC 4.58 4.35 - 5.65 M/uL    HGB 14.9 13.0 - 16.0 g/dL    HCT 46.4 36.0 - 48.0 %    .3 (H) 78.0 - 100.0 FL    MCH 32.5 24.0 - 34.0 PG    MCHC 32.1 31.0 - 37.0 g/dL    RDW 13.2 11.6 - 14.5 %    PLATELET 417 287 - 591 K/uL    MPV 10.0 9.2 - 11.8 FL    NRBC 0.0 0  WBC    ABSOLUTE NRBC 0.00 0.00 - 0.01 K/uL    NEUTROPHILS 65 40 - 73 %    LYMPHOCYTES 25 21 - 52 %    MONOCYTES 8 3 - 10 %    EOSINOPHILS 1 0 - 5 %    BASOPHILS 0 0 - 2 %    IMMATURE GRANULOCYTES 1 (H) 0.0 - 0.5 %    ABS. NEUTROPHILS 6.5 1.8 - 8.0 K/UL    ABS. LYMPHOCYTES 2.6 0.9 - 3.6 K/UL    ABS. MONOCYTES 0.8 0.05 - 1.2 K/UL    ABS. EOSINOPHILS 0.1 0.0 - 0.4 K/UL    ABS. BASOPHILS 0.0 0.0 - 0.1 K/UL    ABS. IMM.  GRANS. 0.1 (H) 0.00 - 0.04 K/UL    DF AUTOMATED     PROTHROMBIN TIME + INR    Collection Time: 02/10/22  6:38 PM   Result Value Ref Range    Prothrombin time 14.0 11.5 - 15.2 sec    INR 1.1 0.8 - 1.2     PTT    Collection Time: 02/10/22  6:38 PM   Result Value Ref Range    aPTT 24.5 23.0 - 36.4 SEC   GLUCOSE, POC    Collection Time: 02/10/22  6:39 PM   Result Value Ref Range    Glucose (POC) 154 (H) 70 - 110 mg/dL   EKG, 12 LEAD, INITIAL    Collection Time: 02/10/22  7:08 PM   Result Value Ref Range    Ventricular Rate 137 BPM    Atrial Rate 137 BPM    P-R Interval 138 ms    QRS Duration 74 ms    Q-T Interval 288 ms    QTC Calculation (Bezet) 434 ms    Calculated P Axis 52 degrees    Calculated R Axis 22 degrees    Calculated T Axis 58 degrees    Diagnosis       Sinus tachycardia  Possible Left atrial enlargement  Borderline ECG  When compared with ECG of 18-JAN-2011 13:29,  Nonspecific T wave abnormality no longer evident in Inferior leads  T wave amplitude has increased in Anterolateral leads        Labs Reviewed   METABOLIC PANEL, BASIC - Abnormal; Notable for the following components:       Result Value    CO2 19 (*)     Glucose 180 (*)     BUN 23 (*)     Creatinine 1.57 (*)     GFR est AA 55 (*)     GFR est non-AA 45 (*)     All other components within normal limits   CBC WITH AUTOMATED DIFF - Abnormal; Notable for the following components:    .3 (*)     IMMATURE GRANULOCYTES 1 (*)     ABS. IMM. GRANS. 0.1 (*)     All other components within normal limits   GLUCOSE, POC - Abnormal; Notable for the following components:    Glucose (POC) 154 (*)     All other components within normal limits   PROTHROMBIN TIME + INR   PTT   DRUG SCREEN, URINE   URINALYSIS W/ RFLX MICROSCOPIC       Radiologic Studies -   CT HEAD WO CONT   Final Result      No acute intracranial abnormalities. Encephalomalacia both frontal lobes and significant small vessel ischemic   disease. Dr. Rosemary Trujillo informed 7:02 PM February 10, 2022. XR CHEST PORT    (Results Pending)   CTA HEAD NECK W CONT    (Results Pending)     CT Results  (Last 48 hours)               02/10/22 1856  CT HEAD WO CONT Final result    Impression:      No acute intracranial abnormalities. Encephalomalacia both frontal lobes and significant small vessel ischemic   disease. Dr. Rosemary Trujillo informed 7:02 PM February 10, 2022.        Narrative:  EXAM: CT head       INDICATION: Stroke alert, CODE S, left-sided deviation, unresponsive       COMPARISON: March 22, 2006       TECHNIQUE: Axial CT imaging of the head was performed without intravenous   contrast. One or more dose reduction techniques were used on this CT: automated   exposure control, adjustment of the mAs and/or kVp according to patient size,   and iterative reconstruction techniques. The specific techniques used on this   CT exam have been documented in the patient's electronic medical record. Digital   Imaging and Communications in Medicine (DICOM) format image data are available   to nonaffiliated external healthcare facilities or entities on a secure, media   free, reciprocally searchable basis with patient authorization for at least a   12-month period after this study. _______________       FINDINGS:       BRAIN AND POSTERIOR FOSSA: The sulci, folia, ventricles and basal cisterns are   within normal limits for the patient's age. There is no intracranial hemorrhage,   mass effect, or midline shift. There is left frontal lobe encephalomalacia. There is encephalomalacia of the right frontal lobe. There is significant small   vessel ischemic disease. EXTRA-AXIAL SPACES AND MENINGES: There are no abnormal extra-axial fluid   collections. CALVARIUM: There is a left frontal craniotomy flap. SINUSES: Clear. OTHER: None.       _______________               CXR Results  (Last 48 hours)    None          The laboratory results, imaging results, and other diagnostic exams were reviewed in the EMR. Medical Decision Making   I am the first provider for this patient. I reviewed the vital signs, available nursing notes, past medical history, past surgical history, family history and social history. Vital Signs-Reviewed the patient's vital signs. ED EKG interpretation:  Rhythm: sinus tachycardia; and regular .  Rate (approx.): 137; Axis: normal; P wave: normal; QRS interval: normal ; ST/T wave: normal; Other findings: normal. This EKG was interpreted by Merissa Ferrer D.O. Records Reviewed: Personally, on initial evaluation    MDM:   Nolan Morton presents with complaint of altered mental status  DDX includes but is not limited to: Intracranial hemorrhage, CVA, seizure    Patient unresponsive, nonverbal, and with leftward gaze deviation. Will obtain lab work and imaging for further evaluation of patients complaint. Will Rony patient at this time. Will continue to monitor and evaluate patient while in the ED. Orders as below:  Orders Placed This Encounter    CT HEAD WO CONT    XR CHEST PORT    CTA HEAD NECK W CONT    METABOLIC PANEL, BASIC    CBC WITH AUTOMATED DIFF    DRUG SCREEN, URINE    PROTHROMBIN TIME + INR    PTT    URINALYSIS W/ RFLX MICROSCOPIC    DIET NPO    POC GLUCOSE    VITAL SIGNS    WEIGH PATIENT    NIH STROKE SCALE ASSESSMENT    NEUROLOGIC STATUS ASSESSMENT    CARDIAC MONITORING    COMPLETE DYSPHAGIA SCREEN - PRIOR TO PROVIDING PATIENT ANY ORAL INTAKE    IP CONSULT TO NEUROLOGY    GLUCOSE, POC    EKG, 12 LEAD, INITIAL    folic acid (FOLVITE) 1 mg tablet    Xarelto 15 mg tab tablet    tamsulosin (FLOMAX) 0.4 mg capsule    LORazepam (ATIVAN) 2 mg/mL injection    LORazepam (ATIVAN) injection 1 mg    valproate (DEPACON) 500 mg/5 mL (100 mg/mL) injection 1,622 mg    iopamidoL (ISOVUE-370) 76 % injection 100 mL    IP CONSULT TO TELE-NEUROLOGY        ED Course:   ED Course as of 02/10/22 1924   Thu Feb 10, 2022   1907 CT scan of the head Without contrast shows no acute intracranial abnormalities. Neurology recommending obtaining CTA of the head and neck as well as loading patient with valproic acid for further treatment of possible focal status epilepticus. We will continue to monitor patient. [DV]   90Sony Srinivasan with neurology here at [facility] who states unable to perform EEG overnight.   Recommended transfer and if unable to should be admitted to the hospital for further treatment and evaluation. We will continue to monitor patient [DV]      ED Course User Index  [DV] Alma Kelly DO       7:29 PM : Pt care transferred to Dr. Travis Valdez  ,ED provider. History of patient complaint(s), available diagnostic reports and current treatment plan has been discussed thoroughly. Bedside rounding on patient occured : no . Intended disposition of patient : Transfer vs Admit  Pending diagnostics reports and/or labs (please list): Viridiana Vega assistance in completion of this plan is greatly appreciated but it should be noted that I will be the provider of record for this patient. Procedures:  Procedures        Diagnosis and Disposition     CLINICAL IMPRESSION:  No diagnosis found. Current Discharge Medication List          Disposition: TBD      Critical Care Time:   The services I provided to this patient were to treat and/or prevent clinically significant deterioration that could result in the failure of one or more body systems and/or organ systems due to AMS, Status eplilepticus. Services included the following:  -reviewing nursing notes and old charts  -vital sign assessments  -direct patient care  -medication orders and management  -interpreting and reviewing diagnostic studies/labs  -re-evaluations  -documentation time    Aggregate critical care time was 41 minutes, which includes only time during which I was engaged in work directly related to the patient's care as described above, whether I was at bedside or elsewhere in the Emergency Department. It did not include time spent performing other reported procedures or the services of residents, students, nurses, or advance practice providers. Herrera Mcallister D.O.    7:24 PM          Dragon Disclaimer     Please note that this dictation was completed with BackType, the computer voice recognition software.   Quite often unanticipated grammatical, syntax, homophones, and other interpretive errors are inadvertently transcribed by the computer software. Please disregard these errors. Please excuse any errors that have escaped final proofreading. Edson PATEL

## 2022-02-11 NOTE — PROGRESS NOTES
0800: Bedside and Verbal shift change report given to [address] (oncoming nurse) by Kevan Bowers RN (offgoing nurse). Report included the following information SBAR, Kardex, ED Summary, Intake/Output, MAR, Accordion, Recent Results, Med Rec Status, Cardiac Rhythm SR, Alarm Parameters  and Dual Neuro Assessment. 7325: Primary Nurse Ally Guallpa RN and Marc Cardenas RN performed a dual skin assessment on this patient Impairment noted- see wound doc flow sheet  Meek score is 15    0945: Spoke with pt's brother, Rey Quesada. Per Ed, pt lives at home with him and Ed is his primary caretaker. Did home med rec and MRI screening form. Provided updates and answered questions. Discussed who else could receive information about the pt and their names were added to the chart. Pt attends [employer], a day program for people with strokes and TBI. 1015: Updated pt's sister, Earlene Muñoz, via phone. 1030: Discussed time pt could go to MRI with MRI staff, told to call back after 1500.    1515: Called MRI, was told MRI will call back later with a time. 1615: Updated pt's brother, Rey Quesada, via phone. [id number]: Pt traveled to MRI without incident with RN x2, 2L O2, monitor, and IV pump with Raheem infusing. 1850: Called pt's brother, Rey Quesada, to tell him pt was in MRI and that someone would call him with the results later. 1935: Pt unable to be still in MRI. Orders obtained from [facility] NP.    1955: Bedside and Verbal shift change report given to SebasAmbrose Mark (oncoming nurse) by Reggie Garcia RN (offgoing nurse). Report included the following information SBAR, Kardex, ED Summary, Intake/Output, MAR, Accordion, Recent Results, Med Rec Status, Cardiac Rhythm SR, Alarm Parameters  and Dual Neuro Assessment.

## 2022-02-11 NOTE — H&P
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 Upon arrival to ER he was noted to ba actively seizing and was given lorazepam, phenobarbital, Keppra, Depatoke with cessation of seizure activity. On arrival he had left gaze deviation and was moving the left upper and lowe ext. but not the right upper or lower. Head CT and CTA were completed with no acute findings but with encephalomalacia of both frontal lkobes and significant small vessel disease. UA and PCXR were NEG for infectious concerns and WBC 10. Utox was NEG. He was transferred to [facility] in stable condition for cEEG monitoring with consult to Dr. Thao Flores by sending facility. Past Medical History:      has a past medical history of H/O blood clots, Seizures ([facility] [address] and Stroke ([facility] [address] Past Surgical History:      has no past surgical history on file. Home Medications:     Prior to Admission medications    Medication Sig Start Date End Date Taking? Authorizing Provider   folic acid (FOLVITE) 1 mg tablet Take 1 mg by mouth daily. 21   Other, MD Olivia   Xarelto 15 mg tab tablet  22   Other, MD Olivia   tamsulosin (FLOMAX) 0.4 mg capsule Take 0.4 mg by mouth daily. 21   Other, MD Olivia       Allergies/Social/Family History:     No Known Allergies   Social History     Tobacco Use    Smoking status: Not on file    Smokeless tobacco: Not on file   Substance Use Topics    Alcohol use: Not on file      No family history on file. Review of Systems:     Review of systems not obtained due to patient factors. Objective:   Vital Signs: There were no vitals taken for this visit. No data recorded. Intake/Output:   No intake or output data in the 24 hours ending 02/11/22 0140    Physical Exam:    General:  Minimally responsive, sedated, well noursished, well developed, appears stated age   Eyes:  Sclera anicteric. Pupils equally round and reactive to light, pinpoint BILAT. Mouth/Throat: Mucous membranes normal, oral pharynx clear, edentulous   Neck: Supple   Lungs:   Clear to auscultation bilaterally, good effort, sonorous respirations   CV:  Regular rate and rhythm,no murmur, click, rub or gallop   Abdomen:   Soft, non-tender. bowel sounds normal. non-distended   Extremities: No cyanosis or edema   Skin: Skin color, texture, turgor normal. + red flakey rash to medial aspect of ankles bilat. Lymph nodes: Cervical and supraclavicular normal   Musculoskeletal: No swelling or deformity. WD LUE/LLE, RUE and no WD to RLE   Lines/Devices:  Intact, no erythema, drainage or tenderness   Psych: Alert and oriented, normal mood affect given the setting       LABS AND  DATA: Personally reviewed  Recent Labs     02/10/22  1838   WBC 10.0   HGB 14.9   HCT 46.4        Recent Labs     02/10/22  1838      K 3.5      CO2 19*   BUN 23*   CREA 1.57*   *   CA 9.4     No results for input(s): AP, TBIL, TP, ALB, GLOB, AML, LPSE in the last 72 hours. No lab exists for component: SGOT, GPT, AMYP  Recent Labs     02/10/22  1838   INR 1.1   PTP 14.0   APTT 24.5      No results for input(s): PHI, PCO2I, PO2I, FIO2I in the last 72 hours. No results for input(s): CPK, CKMB, TROIQ, BNPP in the last 72 hours.     MEDS: Reviewed    Chest X-Ray:  CXR Results  (Last 48 hours)               02/10/22 1957  XR CHEST PORT Final result    Impression:  No acute process       Narrative:  EXAM:  AP Portable Chest X-ray 1 view        INDICATION: Altered mental status       COMPARISON: March 22, 2006       _______________       FINDINGS:  Heart and mediastinal contours are within normal limits for portable radiograph. Lungs are clear of active disease. There are no pleural effusions. No acute osseous findings. ________________                   CT Results  (Last 48 hours)               02/10/22 1856  CT HEAD WO CONT Final result    Impression:      No acute intracranial abnormalities. Encephalomalacia both frontal lobes and significant small vessel ischemic   disease. Dr. Cristhian Contreras informed 7:02 PM February 10, 2022. Narrative:  EXAM: CT head       INDICATION: Stroke alert, CODE S, left-sided deviation, unresponsive       COMPARISON: March 22, 2006       TECHNIQUE: Axial CT imaging of the head was performed without intravenous   contrast. One or more dose reduction techniques were used on this CT: automated   exposure control, adjustment of the mAs and/or kVp according to patient size,   and iterative reconstruction techniques. The specific techniques used on this   CT exam have been documented in the patient's electronic medical record. Digital   Imaging and Communications in Medicine (DICOM) format image data are available   to nonaffiliated external healthcare facilities or entities on a secure, media   free, reciprocally searchable basis with patient authorization for at least a   12-month period after this study. _______________       FINDINGS:       BRAIN AND POSTERIOR FOSSA: The sulci, folia, ventricles and basal cisterns are   within normal limits for the patient's age. There is no intracranial hemorrhage,   mass effect, or midline shift. There is left frontal lobe encephalomalacia. There is encephalomalacia of the right frontal lobe. There is significant small   vessel ischemic disease. EXTRA-AXIAL SPACES AND MENINGES: There are no abnormal extra-axial fluid   collections. CALVARIUM: There is a left frontal craniotomy flap. SINUSES: Clear.        OTHER: None.       _______________               Multidisciplinary Rounds Completed:  Pending    ABCDEF Bundle/Checklist Completed: Yes    Active Problem List:     Problem List  Never Reviewed          Codes Class    Status epilepticus ([facility] [address] ICD-10-CM: G40.901  ICD-9-CM: 459. 3             ICU Assessment/ Comprehensive Plan of Care:       NEURO  1. Status epilepticus with Hx of CVA, seizure (not on antiepileptic Tx)  -Q1hr neuro checks  -Continue Keppra 1GM IV Q12hr  -Lorazepam PRN seizures  -Consultation to neurology, Shera Apgar NP notified  -Plan for cEEG monitoring  -Check MRI brain  -Seizure precautions    CARDIAC  1. No acute concerns, Hx of HTN, HLD  -Cardiac monitoring  -Trend BP, treat with PRN meds    RESPIRATORY  1. No acute concerns, protecting airway without concerns  -Supplemental oxygen PRN for sats > 92%    RENAL  1. Mild CHRISS, unknown Hx, BUN/creat 1.57  -Trend BMP  -trend urine output    Arevalo Catheter Present: Yes  IVFs: LR @ 50ml/hr    GASTROINTESTINAL  1. No acute concerns  - NPO    HEMATOLOGIC  1. No acute concerns, Hx of DVTs/ PEs  -Hold Xeralto per home regimen  -Therapeutic enoxaparin while AMS  -Trend coags, CBC    ID  1. No acute concerns for infection  - UA NEG, PCXR NEG  -No leukocytosis, afebrile    ENDOCRINE  1. No acute concerns, no Hx of DM  -glucose goal 140-180    SKIN  1.  Skin rash, appears chronic  -Consult to wound care for rash to medial side of ankles BILAT    F - Feeding:  NPO  A - Analgesia: acetaminophen  S - Sedation: GOAL RASS 0  T - DVT Prophylaxis: SCD's or Sequential Compression Device , enoxaparin  H - Head of Bed: > 30 Degrees  U - Ulcer Prophylaxis: NA  G - Glycemic Control: NA  S - Spontaneous Breathing Trial: NA  B - Bowel Regimen: NA  I - Indwelling Catheter:              T/L/D  Tubes: None  Lines: Peripheral IV  Drains: Arevalo Catheter  D - De-escalation of Antibiotics:  NA      DISPOSITION/COMMUNICATION  Discussed Plan of Care/Code Status: Full Code  Stay in ICU    CRITICAL CARE CONSULTANT NOTE  I had a face to face encounter with the patient, reviewed and interpreted patient data including clinical events, labs, images, vital signs, I/O's, and examined patient. I have discussed the case and the plan and management of the patient's care with the consulting services, the bedside nurses and the respiratory therapist.      NOTE OF PERSONAL INVOLVEMENT IN CARE   This patient has a high probability of imminent, clinically significant deterioration, which requires the highest level of preparedness to intervene urgently. I participated in the decision-making and personally managed or directed the management of the following life and organ supporting interventions that required my frequent assessment to treat or prevent imminent deterioration. I personally spent 50 minutes of critical care time. This is time spent at this critically ill patient's bedside actively involved in patient care as well as the coordination of care and discussions with the patient's family. This does not include any procedural time which has been billed separately.     Crystal Isabel, AGACNP-BC  [facility] Practitioner  [facility]  2/11/2022

## 2022-02-12 LAB
ANION GAP SERPL CALC-SCNC: 4 MMOL/L (ref 5–15)
BUN SERPL-MCNC: 15 MG/DL (ref 6–20)
BUN/CREAT SERPL: 21 (ref 12–20)
CALCIUM SERPL-MCNC: 8.4 MG/DL (ref 8.5–10.1)
CHLORIDE SERPL-SCNC: 109 MMOL/L (ref 97–108)
CO2 SERPL-SCNC: 24 MMOL/L (ref 21–32)
CREAT SERPL-MCNC: 0.72 MG/DL (ref 0.7–1.3)
ERYTHROCYTE [DISTWIDTH] IN BLOOD BY AUTOMATED COUNT: 13.3 % (ref 11.5–14.5)
GLUCOSE BLD STRIP.AUTO-MCNC: 105 MG/DL (ref 65–117)
GLUCOSE BLD STRIP.AUTO-MCNC: 117 MG/DL (ref 65–117)
GLUCOSE BLD STRIP.AUTO-MCNC: 54 MG/DL (ref 65–117)
GLUCOSE BLD STRIP.AUTO-MCNC: 67 MG/DL (ref 65–117)
GLUCOSE BLD STRIP.AUTO-MCNC: 80 MG/DL (ref 65–117)
GLUCOSE SERPL-MCNC: 70 MG/DL (ref 65–100)
HCT VFR BLD AUTO: 39.6 % (ref 36.6–50.3)
HGB BLD-MCNC: 13.3 G/DL (ref 12.1–17)
MAGNESIUM SERPL-MCNC: 2.1 MG/DL (ref 1.6–2.4)
MCH RBC QN AUTO: 32.7 PG (ref 26–34)
MCHC RBC AUTO-ENTMCNC: 33.6 G/DL (ref 30–36.5)
MCV RBC AUTO: 97.3 FL (ref 80–99)
NRBC # BLD: 0 K/UL (ref 0–0.01)
NRBC BLD-RTO: 0 PER 100 WBC
PLATELET # BLD AUTO: 110 K/UL (ref 150–400)
PMV BLD AUTO: 10.5 FL (ref 8.9–12.9)
POTASSIUM SERPL-SCNC: 3.6 MMOL/L (ref 3.5–5.1)
RBC # BLD AUTO: 4.07 M/UL (ref 4.1–5.7)
SERVICE CMNT-IMP: ABNORMAL
SERVICE CMNT-IMP: NORMAL
SODIUM SERPL-SCNC: 137 MMOL/L (ref 136–145)
WBC # BLD AUTO: 8.6 K/UL (ref 4.1–11.1)

## 2022-02-12 PROCEDURE — 74011250636 HC RX REV CODE- 250/636: Performed by: NURSE PRACTITIONER

## 2022-02-12 PROCEDURE — 74011000250 HC RX REV CODE- 250: Performed by: NURSE PRACTITIONER

## 2022-02-12 PROCEDURE — 36415 COLL VENOUS BLD VENIPUNCTURE: CPT

## 2022-02-12 PROCEDURE — 74011000258 HC RX REV CODE- 258: Performed by: PSYCHIATRY & NEUROLOGY

## 2022-02-12 PROCEDURE — 80048 BASIC METABOLIC PNL TOTAL CA: CPT

## 2022-02-12 PROCEDURE — 83735 ASSAY OF MAGNESIUM: CPT

## 2022-02-12 PROCEDURE — 65610000006 HC RM INTENSIVE CARE

## 2022-02-12 PROCEDURE — 82962 GLUCOSE BLOOD TEST: CPT

## 2022-02-12 PROCEDURE — 74011000258 HC RX REV CODE- 258: Performed by: NURSE PRACTITIONER

## 2022-02-12 PROCEDURE — 85027 COMPLETE CBC AUTOMATED: CPT

## 2022-02-12 PROCEDURE — 65660000000 HC RM CCU STEPDOWN

## 2022-02-12 PROCEDURE — 99233 SBSQ HOSP IP/OBS HIGH 50: CPT | Performed by: PSYCHIATRY & NEUROLOGY

## 2022-02-12 PROCEDURE — 74011250636 HC RX REV CODE- 250/636: Performed by: PSYCHIATRY & NEUROLOGY

## 2022-02-12 RX ORDER — DEXTROSE, SODIUM CHLORIDE, AND POTASSIUM CHLORIDE 5; .45; .15 G/100ML; G/100ML; G/100ML
75 INJECTION INTRAVENOUS CONTINUOUS
Status: DISCONTINUED | OUTPATIENT
Start: 2022-02-12 | End: 2022-02-15

## 2022-02-12 RX ADMIN — POTASSIUM CHLORIDE, DEXTROSE MONOHYDRATE AND SODIUM CHLORIDE 75 ML/HR: 150; 5; 450 INJECTION, SOLUTION INTRAVENOUS at 13:19

## 2022-02-12 RX ADMIN — HYDROCORTISONE: 1 CREAM TOPICAL at 08:26

## 2022-02-12 RX ADMIN — LEVETIRACETAM 1000 MG: 100 INJECTION, SOLUTION INTRAVENOUS at 08:31

## 2022-02-12 RX ADMIN — ENOXAPARIN SODIUM 80 MG: 100 INJECTION SUBCUTANEOUS at 23:46

## 2022-02-12 RX ADMIN — ENOXAPARIN SODIUM 80 MG: 100 INJECTION SUBCUTANEOUS at 08:31

## 2022-02-12 RX ADMIN — SODIUM CHLORIDE, PRESERVATIVE FREE 10 ML: 5 INJECTION INTRAVENOUS at 14:00

## 2022-02-12 RX ADMIN — DEXTROSE MONOHYDRATE 250 ML: 10 INJECTION, SOLUTION INTRAVENOUS at 11:18

## 2022-02-12 RX ADMIN — LEVETIRACETAM 1500 MG: 100 INJECTION INTRAVENOUS at 23:46

## 2022-02-12 RX ADMIN — SODIUM CHLORIDE, PRESERVATIVE FREE 10 ML: 5 INJECTION INTRAVENOUS at 22:00

## 2022-02-12 RX ADMIN — SODIUM CHLORIDE, PRESERVATIVE FREE 10 ML: 5 INJECTION INTRAVENOUS at 06:00

## 2022-02-12 RX ADMIN — LATANOPROST 1 DROP: 50 SOLUTION OPHTHALMIC at 23:48

## 2022-02-12 RX ADMIN — HYDROCORTISONE: 1 CREAM TOPICAL at 23:48

## 2022-02-12 RX ADMIN — SODIUM CHLORIDE, POTASSIUM CHLORIDE, SODIUM LACTATE AND CALCIUM CHLORIDE 100 ML/HR: 600; 310; 30; 20 INJECTION, SOLUTION INTRAVENOUS at 07:05

## 2022-02-12 RX ADMIN — DEXTROSE MONOHYDRATE 125 ML: 10 INJECTION, SOLUTION INTRAVENOUS at 18:00

## 2022-02-12 RX ADMIN — LATANOPROST 1 DROP: 50 SOLUTION OPHTHALMIC at 18:21

## 2022-02-12 NOTE — PROGRESS NOTES
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 Upon arrival to ER he was noted to ba actively seizing and was given lorazepam, phenobarbital, Keppra, Depatoke with cessation of seizure activity. On arrival he had left gaze deviation and was moving the left upper and lowe ext. but not the right upper or lower. Head CT and CTA were completed with no acute findings but with encephalomalacia of both frontal lkobes and significant small vessel disease. UA and PCXR were NEG for infectious concerns and WBC 10. Utox was NEG. He was transferred to [facility] in stable condition for cEEG monitoring with consult to Dr. Woody Jaimes by sending facility. Reason for ICU Admission: Possible continuous EEG monitoring for status epilepticus     Subjective:     Overnight Events:   - EEG done and no active or ongoing seizures  - Awakens but still drowsy  - Hypoglycemic earlier this morning and D10 infusion ordered  - Updated brother at bedside    POD:* No surgery found *      Daily Plan of Care:     Status Epilepticus with seizure disorder not on home medications  - Continue Keppra 100mg IV BID for AED  - Neurology following   - MRI brain from  with no acute infarct but extensive encephalomalacia to B/L frontal lobes    H/o DVT/PE  - Takes Xarelto at home  - Therapeutic Lovenox dosing ordered    Active Problem List:     Problem List  Never Reviewed          Codes Class    Status epilepticus ([facility] [address] ICD-10-CM: G40.901  ICD-9-CM: 345.3               Past Medical History:      has a past medical history of H/O blood clots, Seizures ([facility] [address] and Stroke ([facility] [address]     Past Surgical History:      has no past surgical history on file. Home Medications:     Prior to Admission medications    Medication Sig Start Date End Date Taking? Authorizing Provider   bimatoprost (LUMIGAN) 0.01 % ophthalmic drops Administer 1 Drop to both eyes every evening. Yes Provider, Historical   multivitamin (ONE A DAY) tablet Take 1 Tablet by mouth daily. Yes Provider, Historical   folic acid (FOLVITE) 1 mg tablet Take 1 mg by mouth daily. 21   Other, MD Olivia   Xarelto 15 mg tab tablet  22   Other, MD Olivia   tamsulosin (FLOMAX) 0.4 mg capsule Take 0.4 mg by mouth daily. 21   Other, MD Olivia       Allergies/Social/Family History:     No Known Allergies   Social History     Tobacco Use    Smoking status: Not on file    Smokeless tobacco: Not on file   Substance Use Topics    Alcohol use: Not on file      No family history on file. Review of Systems:     Unable to obtain due to patient factors    Objective:   Vital Signs:  Visit Vitals  /64   Pulse 78   Temp 97.9 °F (36.6 °C)   Resp 14   Wt 83.6 kg (184 lb 4.9 oz)   SpO2 97%   BMI 24.32 kg/m²    O2 Flow Rate (L/min): 2 l/min O2 Device: Nasal cannula Temp (24hrs), Av.7 °F (37.1 °C), Min:97.9 °F (36.6 °C), Max:99.4 °F (37.4 °C)           Intake/Output:     Intake/Output Summary (Last 24 hours) at 2022 0733  Last data filed at 2022 0700  Gross per 24 hour   Intake 3691.84 ml   Output 2060 ml   Net 1631.84 ml       LABS AND  DATA: Personally reviewed  Recent Labs     22  0400 02/10/22  1838   WBC 13.2* 10.0   HGB 12.8 14.9   HCT 39.7 46.4   * 169     Recent Labs     22  0400 02/10/22  1838    141   K 4.7 3.5   * 107   CO2 25 19*   BUN 18 23*   CREA 1.02 1.57*   GLU 95 180*   CA 8.5 9.4   MG 2.5*  --      Recent Labs     22  0400   AP 77   TP 6.3*   ALB 3.3*   GLOB 3.0     Recent Labs     02/10/22  1838   INR 1.1   PTP 14.0   APTT 24.5      No results for input(s): PHI, PCO2I, PO2I, FIO2I in the last 72 hours.   No results for input(s): CPK, CKMB, TROIQ, BNPP in the last 72 hours. MEDS: Reviewed 2/12    Chest X-Ray: personally reviewed and report checked from 2/10    CT Results  (Last 48 hours)               02/10/22 2005  CTA HEAD NECK W CONT Final result    Impression:      1. Previous right carotid stent with 55% diameter stenosis by NASCET criteria   within stent due to neointimal hyperplasia. 2. 62% diameter stenosis origin left ICA by NASCET criteria. 3. Mild stenosis right intradural vertebral artery, no other significant   vertebral or basilar stenosis. 4. No evidence of intracranial large vessel occlusion or significant stenosis. Initial findings discussed with Dr. Milady Mcdaniel by Dr. Apryl Cueto at 7:53 PM               Narrative:  EXAM:  CT angiogram of the head and neck with intravenous contrast.       CLINICAL INDICATION/HISTORY:Altered level of consciousness, possible CVA. COMPARISON: CT head same day       TECHNIQUE:  Following IV administration of nonionic contrast, helical CTA head   and neck performed. Three-dimensional, maximum intensity projection, and curved   planar reformations were post-processed at a dedicated outside facility ([employer]). Stenoses are reported with reference to the downstream lumen   (\"NASCET\"). One or more dose reduction techniques were used on this CT:   automated exposure control, adjustment of the mAs and/or kVp according to   patient's size, and iterative reconstruction techniques. The specific techniques   utilized on this CT exam have been documented in the patient's electronic   medical record. Digital imaging and communications and medicine (DICOM) format   image data are available to nonaffiliated external healthcare facilities or   entities on a secure, media free, reciprocally searchable basis with patient   authorization for at least a 12 month period after this study.        _______________       FINDINGS:        NECK CTA:            ARTERIAL BOLUS QUALITY:  Satisfactory. AORTIC ARCH: Normal branching pattern. No proximal great vessel stenosis. RIGHT CAROTID ARTERY:  No significant common carotid artery stenosis. There   is a stent in place across left carotid ICA origin and into the proximal ICA,   with neointimal hyperplasia causing narrowing of the proximal ICA smallest   residual lumen 2 mm, normal caliber cervical ICA 4.4 mm, 55% diameter stenosis   by NASCET criteria. No significant distal cervical ICA stenosis. LEFT CAROTID ARTERY:  Mild eccentric calcified plaque mid common carotid   artery, no significant common carotid artery stenosis. Eccentric plaque at the   origin left ICA narrowing the lumen at the origin 1.9 mm, normal caliber   cervical ICA 5 mm, 62% diameter stenosis by NASCET criteria. No significant   distal ICA stenosis. VERTEBRAL ARTERIES: The vertebral arteries are codominant. RIGHT VERTEBRAL ARTERY:  No stenosis or other vascular abnormality. LEFT VERTEBRAL ARTERY:  No stenosis or other vascular abnormality. LUNG APICES: No abnormal mass with biapical scarring. NECK SOFT TISSUES: 10 mm calcification within the ventral aspect right   submandibular gland without ductal dilatation or abnormal glandular enlargement. No other abnormal mass or adenopathy. OSSEOUS: Degenerative spondylosis, no high-grade canal stenosis. BRAIN CTA:            CAROTID SIPHON AND SUPRACLINOID ICA: Mild atherosclerotic changes without   stenosis or aneurysm. M1 SEGMENT MCA: No significant stenosis or aneurysm. PROXIMAL M2 SEGMENT MCA: No significant stenosis or aneurysm. A1 SEGMENT, ANTERIOR COMMUNICATING ARTERY AND PROXIMAL A2 SEGMENTS:                  No significant stenosis or aneurysm. Patent anterior communicating   artery.             VERTEBRAL BASILAR SYSTEM:Mild atherosclerotic stenosis right mid intradural   vertebral artery, no other significant distal vertebral or basilar stenosis. PCA:No significant stenosis or aneurysm. DISTAL ANTERIOR CEREBRAL ARTERY:No significant stenosis or aneurysm. DISTAL MCA M2/M3 SEGMENT:No significant stenosis or aneurysm. DURAL VENOUS SINUSES: Unremarkable. BRAIN PARENCHYMA ON SOURCE DATA: Prior left frontal craniotomy with   encephalomalacia/gliosis anterior and inferior left frontal lobe and anterior   lateral left basal ganglia. Additional encephalomalacia and gliosis likely   chronic infarct right frontal lobe. Bilateral white matter hypodensity   nonspecific, with no evidence of intracranial mass mass effect or abnormal   enhancing lesion. _______________           02/10/22 1856  CT HEAD WO CONT Final result    Impression:      No acute intracranial abnormalities. Encephalomalacia both frontal lobes and significant small vessel ischemic   disease. Dr. Oh Nieves informed 7:02 PM February 10, 2022. Narrative:  EXAM: CT head       INDICATION: Stroke alert, CODE S, left-sided deviation, unresponsive       COMPARISON: March 22, 2006       TECHNIQUE: Axial CT imaging of the head was performed without intravenous   contrast. One or more dose reduction techniques were used on this CT: automated   exposure control, adjustment of the mAs and/or kVp according to patient size,   and iterative reconstruction techniques. The specific techniques used on this   CT exam have been documented in the patient's electronic medical record. Digital   Imaging and Communications in Medicine (DICOM) format image data are available   to nonaffiliated external healthcare facilities or entities on a secure, media   free, reciprocally searchable basis with patient authorization for at least a   12-month period after this study.        _______________       FINDINGS:       BRAIN AND POSTERIOR FOSSA: The sulci, folia, ventricles and basal cisterns are   within normal limits for the patient's age. There is no intracranial hemorrhage,   mass effect, or midline shift. There is left frontal lobe encephalomalacia. There is encephalomalacia of the right frontal lobe. There is significant small   vessel ischemic disease. EXTRA-AXIAL SPACES AND MENINGES: There are no abnormal extra-axial fluid   collections. CALVARIUM: There is a left frontal craniotomy flap. SINUSES: Clear. OTHER: None.       _______________                 Physical Assessment:     General: Drowsy gentleman in no acute distress  EENT: PERRL 3mm/brisk. Moist mucous membranes. Neck: Supple. No lymphadenopathy  Resp: CTA bilaterally. No wheezing/rhonchi/rales. No accessory muscle use  CV: Regular rhythm, normal rate, no murmurs, gallops, rubs  GI: Soft, non distended, non tender. normoactive bowel sounds  Extremities: No edema, warm, 2+ pulses throughout   Neurologic: Moves all extremities. Opened eyes to voice and followed very simple commands. No speaking during exam  Psych: Poor insight  Skin: Good Turgor, no rashes or ulcers       ICU Comprehensive Bundle:       Multidisciplinary Rounds Completed: Yes    ABCDEF Bundle/Checklist  Pain Medications: None  Target RASS: -1 - Drowsy - Not fully alert, but has sustained awakening to voice  Sedation Medications: None  CAM-ICU:  Negative  Mobility: Poor  PT/OT: PT consulted and on board and OT consulted and on board   Restraints: None needed at this time  Discussed Plan of Care (goals of care): No  Addressed Code Status: Full Code    CARDIOVASCULAR  Cardiac Gtts: None  SBP Goal of: > 110 mmHg  MAP Goal of: > 65 mmHg  Transfusion Trigger (Hgb): <7 g/dL    RESPIRATORY  Vent Goals:    Incentive spirometry  DVT Prophylaxis (if no, list reason): Lovenox   SPO2 Goal: > 92%  Pulmonary toilet: Incentive Spirometry     GI/  Arevalo Catheter Present: Yes  GI Prophylaxis: Not at this time   Nutrition: No. Needs to be more awake  IVFs: NS 75ml/hr  Bowel Movement: No  Bowel Regimen: None needed at this time  Insulin: N/A    ANTIBIOTICS  Antibiotics: None    T/L/D  Tubes: None  Lines: Peripheral IV  Drains: Arevalo Catheter    SPECIAL EQUIPMENT  None    DISPOSITION  Stay in ICU    CRITICAL CARE CONSULTANT NOTE  I had a face to face encounter with the patient, reviewed and interpreted patient data including clinical events, labs, images, vital signs, I/O's, and examined patient. I have discussed the case and the plan and management of the patient's care with the consulting services, the bedside nurses and the respiratory therapist.      NOTE OF PERSONAL INVOLVEMENT IN CARE   This patient has a high probability of imminent, clinically significant deterioration, which requires the highest level of preparedness to intervene urgently. I participated in the decision-making and personally managed or directed the management of the following life and organ supporting interventions that required my frequent assessment to treat or prevent imminent deterioration. I personally spent 35 minutes of critical care time. This is time spent at this critically ill patient's bedside actively involved in patient care as well as the coordination of care and discussions with the patient's family. This does not include any procedural time which has been billed separately.     Christiane Koroma DNP, [facility]  2/12/2022

## 2022-02-12 NOTE — PROGRESS NOTES
Neurology Progress Note  Saeid Cueva NP    Patient: Jacob Garcia MRN: [id number]  SSN: [id number]    YOB: 1960  Age: 64 y.o. Sex: male      Chief Complaint: AMS, unresponsiveness, left gaze deviation, seizure-like activity    HPI: Jacob Garcia is a 64 y.o. 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 According to ER record, his brother who brought him to the hospital stated he ahd a tonic-clonic type seizure in the car prior to getting to the hospital. His mental status had been declining since 5:30pm that day. A Code Stroke was called on arrival to the ER and the patient was taken for [facility] which showed no acute intracranial abnormalities but encephalomalacia in bilateral frontal lobes with significant small vessel ischemic disease. While on the CT table patient had another seizure that lasted less than a minute. 1 mg IV Ativan was given. Patient saw the Teleneurologist and it was recommended that he be loaded with Valproic acid. He was taken back for CTA H/N which showed no LVO on preliminary report. While in CT he had another seizure. He was then given 4 mg IV Ativan. He was given a load of valproate 1622 mg IV and  Keppra 3500 mg IV. He continued to exhibit seizure-like activity so phenobarbital 811.2 mg IV was added. It was recommended that patient be transferred to [facility] [place] ICU for close monitoring, Neurology consult and continuous EEG. Subjective:    No seizures or acute neuro events overnight. Slightly more responsive. Past Medical History:   Diagnosis Date    H/O blood clots     Seizures ([facility]     Stroke ([facility]      No family history on file.   Social History     Tobacco Use    Smoking status: Not on file    Smokeless tobacco: Not on file   Substance Use Topics    Alcohol use: Not on file      Prior to Admission Medications   Prescriptions Last Dose Informant Patient Reported? Taking? Xarelto 15 mg tab tablet   Yes No   folic acid (FOLVITE) 1 mg tablet   Yes No   Sig: Take 1 mg by mouth daily. tamsulosin (FLOMAX) 0.4 mg capsule   Yes No   Sig: Take 0.4 mg by mouth daily. Facility-Administered Medications: None       No Known Allergies    Review of Systems:  Review of systems not obtained due to patient factors. AMS      Objective:     Vitals:    02/11/22 2030 02/11/22 2045 02/11/22 2100 02/11/22 2115   BP: 118/70 130/72 113/69 124/75   Pulse: 81 98 81 86   Resp: 15 22 16 19   Temp:       SpO2: 100% 100% 99% 99%      Physical Exam:  GENERAL: Lethargic male quietly resting  SKIN: Somewhat pale in appearance, warm, dry, color appropriate for ethnicity. Neurologic Exam:  Mental Status:  No eye opening, but did follow simple commands of hand squeezing and toe wiggling bilaterally. Language:    When asked him questions he moaned without opening eyes or actually speaking. Cranial Nerves:   Pupils 2 mm, equal, round and reactive to light. Resists eye opening. No asymmetry noted at rest. Edentulous          Motor:    Squeezed bilateral hands equally and wiggled bilateral toes. Bulk and tone normal.      Spontaneous movement noted     No involuntary movements. Sensation:    Withdraws from noxious stimuli x 4.      Reflexes:     Negative Babinskis    Labs:  Lab Results   Component Value Date/Time    WBC 13.2 (H) 02/11/2022 04:00 AM    HGB 12.8 02/11/2022 04:00 AM    HCT 39.7 02/11/2022 04:00 AM    PLATELET 516 (L) 92/99/2144 04:00 AM    .8 (H) 02/11/2022 04:00 AM      Lab Results   Component Value Date/Time    Sodium 141 02/11/2022 04:00 AM    Potassium 4.7 02/11/2022 04:00 AM    Chloride 113 (H) 02/11/2022 04:00 AM    CO2 25 02/11/2022 04:00 AM    Anion gap 3 (L) 02/11/2022 04:00 AM    Glucose 95 02/11/2022 04:00 AM    BUN 18 02/11/2022 04:00 AM    Creatinine 1.02 02/11/2022 04:00 AM    BUN/Creatinine ratio 18 02/11/2022 04:00 AM    GFR est AA >60 02/11/2022 04:00 AM    GFR est non-AA >60 02/11/2022 04:00 AM    Calcium 8.5 02/11/2022 04:00 AM     No results found for: CPK, RCK1, RCK2, RCK3, RCK4, CKMB, CKNDX, CKND1, TROPT, TROIQ, BNPP, BNP    Imaging:  CT Results (maximum last 3): Results from [place] encounter on 02/10/22    CT HEAD WO CONT    Narrative  EXAM: CT head    INDICATION: Stroke alert, CODE S, left-sided deviation, unresponsive    COMPARISON: March 22, 2006    TECHNIQUE: Axial CT imaging of the head was performed without intravenous  contrast. One or more dose reduction techniques were used on this CT: automated  exposure control, adjustment of the mAs and/or kVp according to patient size,  and iterative reconstruction techniques. The specific techniques used on this  CT exam have been documented in the patient's electronic medical record. Digital  Imaging and Communications in Medicine (DICOM) format image data are available  to nonaffiliated external healthcare facilities or entities on a secure, media  free, reciprocally searchable basis with patient authorization for at least a  12-month period after this study. _______________    FINDINGS:    BRAIN AND POSTERIOR FOSSA: The sulci, folia, ventricles and basal cisterns are  within normal limits for the patient's age. There is no intracranial hemorrhage,  mass effect, or midline shift. There is left frontal lobe encephalomalacia. There is encephalomalacia of the right frontal lobe. There is significant small  vessel ischemic disease. EXTRA-AXIAL SPACES AND MENINGES: There are no abnormal extra-axial fluid  collections. CALVARIUM: There is a left frontal craniotomy flap. SINUSES: Clear. OTHER: None.    _______________    Impression  No acute intracranial abnormalities.     Encephalomalacia both frontal lobes and significant small vessel ischemic  disease. Dr. Cristhian Contreras informed 7:02 PM February 10, 2022. Results from [facility] encounter on 12/04/10    CTA CHEST W AND W/O CONTRAST    Narrative  Ordering MD: Bernie Colbert MD  Interpreted By: Mulu Grajeda MD  ** FINAL ADDENDUM **  ---------------------------------------------------------------------   [facility]  [id number] addendum:  The sagittal and coronal reconstructions were performed as  slab MIP  reconstructions. END OF ADDENDUM   PROCEDURE DATE:  Dec  4 2010  9:30PM    Accession Number:  [id number]  Order No:   [id number]  Procedure:   CTS - CTA CHEST W/WO CONTRAST  CPT Code:   27370  Reason:   SHORTNESS OF BREATH  INTERPRETATION:  INDICATION: Shortness of breath, right leg pain and swelling  Findings:  Isovolumetric imaging was performed through the thorax  during pulmonary arterial phase of nonionic contrast enhancement. Thin section axial, coronal and sagittal reconstructions were  created to optimally display the pulmonary arterial vasculature. Nonocclusive filling defect is present in the main pulmonary  arteries bilaterally, extending into upper lobe and lower lobe  pulmonary arteries on the left, becoming occlusive in the anterior  segment left upper lobe pulmonary artery, and nonocclusive extension  into anterior basal and posterior basal left lower lobe segmental  and subsegmental pulmonary arteries. Embolus on the right is  somewhat more prominent, nearly occlusive in the distal main  pulmonary artery, extending into the upper lobe and intermedius  pulmonary artery, apical and anterior segmental pulmonary arteries  in the right upper lobe, right middle lobe pulmonary artery,  segmental pulmonary arteries to the anterior basal, medial basal and  posterior basal segments of the right lower lobe. (Critical results)  No dilatation of the right ventricle, no evidence of elevated right  heart pressures.  Two small rounded foci of consolidation are present  inferiorly in the posterior right lower lobe. Lungs are otherwise  clear. No pleural  effusions. Small pericardial effusion is noted. No pathological osseous lesions. IMPRESSION:  1. Moderately prominent burden of bilateral pulmonary embolization,  including emboli in the main pulmonary arteries, and segmental  pulmonary arteries to essentially all lobes. No evidence of  significant right heart strain. 2. Small rounded focal consolidations in the inferior right lung,  possibly pulmonary infarcts. Preliminary interpretation provided by [facility] at the time of the study. [facility] radiologist telephoned to the report to Dr. Lissette Sosa at 9:58 p.m.  on 10/04/2010. Assessment:     Hospital Problems  Never Reviewed          Codes Class Noted POA    Status epilepticus ([facility] ICD-10-CM: G40.901  ICD-9-CM: 345.3  2/11/2022 Unknown            Plan:     Seizures. - cEEG 02/11/22 - no active seizure seen.    -Loaded with  valproate 1622 mg IV and  Keppra 3500 mg IV and  phenobarbital 811.2 mg IV  in ER   - Cont AED regimen: Keppra 1000 mg IV bid for now   - CT head showed no acute intracranial abnormalities but encephalomalacia in  bilateral  frontal lobes with significant small vessel ischemic disease. Left craniotomy flap from  previous sx. -CTA H/N -Preliminary report from Dr. Brandon Washburn showed no LVO. Had to call Radiology to get  this information. -MRI brain w wo contrast 02/11/22 showed no acute infarct, extensive areas of  encephalomalacia in the bilateral frontal lobes. No enhancing mass lesion.    - Ativan for seizure activity lasting longer than 5 minutes   - Avoid fluoroquinolones and 4th generation cephalosporins as can lower seizure threshold   - Seizure precautions   - Avoid sedating medications as possible       Further recommendations to follow from Dr. Marcus Overton.        Signed By: Aruna Galvan NP     February 12, 2022        Neurology staff:    I examined the patient at the bedside. I agree with the plans and documentation above from NP Emeline Lundborg. Jourdan Mendoza is a 61-year-old gentleman who is a new patient for me. He was admitted yesterday for status epilepticus. He was loaded with Depakote, phenobarbital, and Keppra and remains on Keppra now 1 g every 12. EEG yesterday without status. CTA negative. Review of the records suggest he has a history of stroke in 2005 and possibly before that traumatic brain injury history. He is unable to confirm any of this for me. He does not participate in the exam.  On exam, he is awake but eyes closed. He will not open his eyes on command but he will stick his tongue out when I asked him. He does not participate. I did not appreciate any facial asymmetry grossly. No abnormal movements. Exam limited. 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 Unclear if he is compliant with medications. I reviewed the imaging and he has significant bilateral frontal encephalomalacia. I am going to increase Keppra as a precaution to 1500 every 12. No need to resume phenobarbital or Depakote at this time. He seems stable out of status at this time. If no ICU needs, okay to transfer out.   Following  [address], DO  Neurologist  Diplomate, American Board of Psychiatry and Neurology  Board Certified, Adult Neurology and Brain Injury Medicine

## 2022-02-12 NOTE — PROGRESS NOTES
Transfer Note    Update at   Mr. Miles's mental status has clinically been improving throughout the day and he is now able to tell us his name, location and even who is playing in the [employer] tomorrow. Neurology has evaluated and is ok with transfer to Neuro Telemetry. He will continue on IV Keppra until can be converted to PO. He is NPO until he is fully awake and then his diet can be advanced as tolerated. He will also continue his therapeutic Lovenox pending taking PO where he can be converted back to Xarelto. Wound care consult placed for B/L leg wounds which he sees a wound care specialist for out in [address] [place] [address]. His brother was updated at bedside and all questions answered. Sign out done with Dr. Kennedi Harp. Opportunity for questions and clarification was provided.       2/12/2022  3:41 PM  [facility]

## 2022-02-12 NOTE — PROGRESS NOTES
[facility]  Hospitalist Group     ICU Transfer/Accept Summary     [place] [address] [place]  DATE OF TRANSFER: 2/12/2022       PATIENT ID: Grace Gutierrez  MRN: [id number]   YOB: 1960    PRIMARY CARE PROVIDER: Unknown, Provider, DPM   DATE OF ADMISSION: 2/11/2022 12:40 AM    ATTENDING PHYSICIAN: Clinton Batres MD  CONSULTATIONS:   IP CONSULT TO NEUROLOGY    PROCEDURES/SURGERIES:   * No surgery found *    REASON FOR ADMISSION: <principal problem not specified>     HOSPITAL PROBLEM LIST:  Patient Active Problem List   Diagnosis Code    Status [facility]pticus [facility] G40.901         Brief HPI and Hospital Course: This is a 63 y/o male with a PMHX of CVA, seizures ( not on antiepileptic tx), 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 Upon arrival to ER he was noted to ba actively seizing and was given lorazepam, phenobarbital, Keppra, Depatoke with cessation of seizure activity. On arrival he had left gaze deviation and was moving the left upper and lowe ext. but not the right upper or lower. Head CT and CTA were completed with no acute findings but with encephalomalacia of both frontal lkobes and significant small vessel disease. UA and PCXR were NEG for infectious concerns and WBC 10. Utox was NEG.   He was transferred to [facility] in stable condition for cEEG monitoring with consult to Dr. Serenity Corral by sending facility.    Patient stable overnight and transferred out of ICU on 2/12       Assessment and Plan:    Status epilepticus with seizure disorder not on home medication  -recieved valproate 1622 mg IV and  Keppra 3500 mg IV and   phenobarbital 811.2 mg IV in ER  -improved and now on keppra 1500 mg iv q 12, D5 1/2 normal saline, prn ativan  -EEG on 2/11 no active seizure seen   -CT head no acute abnormality  -CTA head previous right carotid stent with 55% diameter stenosis by NASCET criteria  within stent due to neointimal hyperplasia. 62% diameter stenosis origin left ICA by NASCET criteria. Mild stenosis right intradural vertebral artery, no other significant  vertebral or basilar stenosis. No evidence of intracranial large vessel occlusion or significant stenosis. -MRI brain no acute finding  -patient sleepy, wakeful, disoriented, follows command and moves all extremities   -continue seizure precaution   -check bedside swallowing    -transfer out of ICU on 2/12  -Neurology on board    Hx of CVA, no residual neurologic deficit   -takes xarelto at home     Hx of DVT/PE  -on therapeutic lovenox, will switch to home xarelto      Code status: Full Code   DVT prophylaxis: on lovenox    Disposition: Home with Family           PHYSICAL EXAMINATION:  Visit Vitals  /69   Pulse 86   Temp 98.4 °F (36.9 °C)   Resp 17   Wt 83.6 kg (184 lb 4.9 oz)   SpO2 98%   BMI 24.32 kg/m²       General:          Sleepy but wakeful, cooperative, no distress  HEENT:           Atraumatic, MMM            Neck:               Supple, symmetrical,  thyroid: non tender  Lungs:             Clear to auscultation bilaterally. No Wheezing or Rhonchi. No rales. Heart:              Regular  rhythm,  No  murmur   No edema  Abdomen:       Soft, non-tender. Not distended. Bowel sounds normal  Extremities:     No cyanosis. No clubbing,  +2 distal pulses  Skin:                Not pale. Not Jaundiced  No rashes   Psych:             Not anxious or agitated.   Neurologic:      Sleepy, wakeful to voice, oriented to self, follows command, moves all extremities,     Labs:     Recent Labs     02/12/22  0708 02/11/22  0400   WBC 8.6 13.2*   HGB 13.3 12.8   HCT 39.6 39.7   * 112*     Recent Labs     02/12/22  0708 02/11/22  0400 02/10/22  1838    141 141   K 3.6 4.7 3.5   * 113* 107   CO2 24 25 19*   BUN 15 18 23*   CREA 0.72 1.02 1.57*   GLU 70 95 180*   CA 8.4* 8.5 9.4   MG 2.1 2.5*  --      Recent Labs 02/11/22  0400   ALT 33   AP 77   TBILI 1.0   TP 6.3*   ALB 3.3*   GLOB 3.0     Recent Labs     02/10/22  1838   INR 1.1   PTP 14.0   APTT 24.5      No results for input(s): FE, TIBC, PSAT, FERR in the last 72 hours. No results found for: FOL, RBCF   No results for input(s): PH, PCO2, PO2 in the last 72 hours. No results for input(s): CPK, CKNDX, TROIQ in the last 72 hours.     No lab exists for component: CPKMB  Lab Results   Component Value Date/Time    Cholesterol, total 160 12/05/2010 03:58 AM    HDL Cholesterol 42 12/05/2010 03:58 AM    LDL, calculated 98.2 12/05/2010 03:58 AM    Triglyceride 99 12/05/2010 03:58 AM    CHOL/HDL Ratio 3.8 12/05/2010 03:58 AM     Lab Results   Component Value Date/Time    Glucose (POC) 117 02/12/2022 12:12 PM    Glucose (POC) 105 02/12/2022 11:49 AM    Glucose (POC) 54 (L) 02/12/2022 11:09 AM    Glucose (POC) 154 (H) 02/10/2022 06:39 PM    Glucose (POC) 85 12/08/2010 11:24 AM    Glucose (POC) 97 12/08/2010 06:49 AM     Lab Results   Component Value Date/Time    Color YELLOW 02/10/2022 07:00 PM    Appearance CLEAR 02/10/2022 07:00 PM    Specific gravity 1.026 02/10/2022 07:00 PM    pH (UA) 5.5 02/10/2022 07:00 PM    Protein TRACE (A) 02/10/2022 07:00 PM    Glucose Negative 02/10/2022 07:00 PM    Ketone 15 (A) 02/10/2022 07:00 PM    Bilirubin Negative 02/10/2022 07:00 PM    Urobilinogen 1.0 02/10/2022 07:00 PM    Nitrites Negative 02/10/2022 07:00 PM    Leukocyte Esterase Negative 02/10/2022 07:00 PM    Epithelial cells 0 to 2 02/10/2022 07:00 PM    Bacteria Negative 02/10/2022 07:00 PM    WBC 0 to 3 02/10/2022 07:00 PM    RBC 0 to 3 02/10/2022 07:00 PM         CODE STATUS:  x Full Code    DNR    Partial    Comfort Care       Signed:   Colonel Noé MD  Date of Service:  2/12/2022  3:53 PM

## 2022-02-12 NOTE — PROCEDURES
[facility]  EEG    Name:  Zahra Scott  MR#:  [id number]  :  1960  ACCOUNT #:  [de-identified]  DATE OF SERVICE:  2022      REQUESTING PHYSICIAN:  Delores Kumar NP    HISTORY:  The patient is a 26-year-old male who is being evaluated after recent multiple witnessed seizure-like episodes. DESCRIPTION:  This is an 18-channel EEG performed on a poorly responsive patient. There is no clear dominant background activity. The background rhythm consists of mixed frequencies ranging from delta to high frequency beta range. Poorly formed sleep architecture in the form of vertex waves were seen in the central region. Stimulation of the patient resulted in muscle artifact and theta frequencies over frontal and central regions. Photic stimulation did not elicit a driving response. Hyperventilation was not performed. EEG SUMMARY:  Abnormal EEG due to the presence of mixed slow and fast frequencies. CLINICAL INTERPRETATION:  This EEG is suggestive of a mild nonspecific type of generalized encephalopathic process. This may be due to the effect of medications. No convincingly lateralizing or epileptiform features were noted. No seizure was recorded.       Beth Lott MD      [id number]  D:  2022 11:33  T:  2022 21:21  JOB #:  [id number]

## 2022-02-12 NOTE — PROGRESS NOTES
0730: Bedside, Verbal and Written shift change report given to Edelmira Sacks, RN (oncoming nurse) by Nella Pugh RN (offgoing nurse). Report included the following information SBAR, Kardex, ED Summary, Intake/Output, MAR, Recent Results, Cardiac Rhythm NSR, Alarm Parameters , Quality Measures and Dual Neuro Assessment. 1118: POC glucose check 54. NP notified and orders for dextrose 10% infusion. Rechecked after completion of infusion and .     1800: POC glucose 67. 125 mL of dextrose infused. Recheck BS 80. NP notified and orders for Q6 POC glucose checks. 1930: Bedside, Verbal and Written shift change report given to Nella Pugh RN (oncoming nurse) by Edelmira Sacks, RN (offgoing nurse). Report included the following information SBAR, Kardex, Intake/Output, MAR, Recent Results, Cardiac Rhythm NSR and Quality Measures.

## 2022-02-13 LAB
ANION GAP SERPL CALC-SCNC: 5 MMOL/L (ref 5–15)
BUN SERPL-MCNC: 9 MG/DL (ref 6–20)
BUN/CREAT SERPL: 11 (ref 12–20)
CALCIUM SERPL-MCNC: 8.4 MG/DL (ref 8.5–10.1)
CHLORIDE SERPL-SCNC: 106 MMOL/L (ref 97–108)
CO2 SERPL-SCNC: 25 MMOL/L (ref 21–32)
CREAT SERPL-MCNC: 0.81 MG/DL (ref 0.7–1.3)
ERYTHROCYTE [DISTWIDTH] IN BLOOD BY AUTOMATED COUNT: 13 % (ref 11.5–14.5)
GLUCOSE BLD STRIP.AUTO-MCNC: 148 MG/DL (ref 65–117)
GLUCOSE SERPL-MCNC: 157 MG/DL (ref 65–100)
HCT VFR BLD AUTO: 42.1 % (ref 36.6–50.3)
HGB BLD-MCNC: 14.5 G/DL (ref 12.1–17)
MAGNESIUM SERPL-MCNC: 1.9 MG/DL (ref 1.6–2.4)
MCH RBC QN AUTO: 32.8 PG (ref 26–34)
MCHC RBC AUTO-ENTMCNC: 34.4 G/DL (ref 30–36.5)
MCV RBC AUTO: 95.2 FL (ref 80–99)
NRBC # BLD: 0 K/UL (ref 0–0.01)
NRBC BLD-RTO: 0 PER 100 WBC
PHOSPHATE SERPL-MCNC: 1.7 MG/DL (ref 2.6–4.7)
PLATELET # BLD AUTO: 116 K/UL (ref 150–400)
PMV BLD AUTO: 10.1 FL (ref 8.9–12.9)
POTASSIUM SERPL-SCNC: 3.8 MMOL/L (ref 3.5–5.1)
RBC # BLD AUTO: 4.42 M/UL (ref 4.1–5.7)
SERVICE CMNT-IMP: ABNORMAL
SODIUM SERPL-SCNC: 136 MMOL/L (ref 136–145)
WBC # BLD AUTO: 8.2 K/UL (ref 4.1–11.1)

## 2022-02-13 PROCEDURE — 80048 BASIC METABOLIC PNL TOTAL CA: CPT

## 2022-02-13 PROCEDURE — 85027 COMPLETE CBC AUTOMATED: CPT

## 2022-02-13 PROCEDURE — 74011250636 HC RX REV CODE- 250/636: Performed by: NURSE PRACTITIONER

## 2022-02-13 PROCEDURE — 65660000000 HC RM CCU STEPDOWN

## 2022-02-13 PROCEDURE — 82962 GLUCOSE BLOOD TEST: CPT

## 2022-02-13 PROCEDURE — 74011000250 HC RX REV CODE- 250: Performed by: NURSE PRACTITIONER

## 2022-02-13 PROCEDURE — 36415 COLL VENOUS BLD VENIPUNCTURE: CPT

## 2022-02-13 PROCEDURE — 83735 ASSAY OF MAGNESIUM: CPT

## 2022-02-13 PROCEDURE — 74011000258 HC RX REV CODE- 258: Performed by: PSYCHIATRY & NEUROLOGY

## 2022-02-13 PROCEDURE — 74011250636 HC RX REV CODE- 250/636: Performed by: PSYCHIATRY & NEUROLOGY

## 2022-02-13 PROCEDURE — 84100 ASSAY OF PHOSPHORUS: CPT

## 2022-02-13 RX ADMIN — SODIUM CHLORIDE, PRESERVATIVE FREE 10 ML: 5 INJECTION INTRAVENOUS at 06:00

## 2022-02-13 RX ADMIN — POTASSIUM CHLORIDE, DEXTROSE MONOHYDRATE AND SODIUM CHLORIDE 75 ML/HR: 150; 5; 450 INJECTION, SOLUTION INTRAVENOUS at 03:42

## 2022-02-13 RX ADMIN — HYDROCORTISONE: 1 CREAM TOPICAL at 09:35

## 2022-02-13 RX ADMIN — SODIUM CHLORIDE, PRESERVATIVE FREE 10 ML: 5 INJECTION INTRAVENOUS at 15:56

## 2022-02-13 RX ADMIN — LATANOPROST 1 DROP: 50 SOLUTION OPHTHALMIC at 22:26

## 2022-02-13 RX ADMIN — ENOXAPARIN SODIUM 80 MG: 100 INJECTION SUBCUTANEOUS at 22:26

## 2022-02-13 RX ADMIN — HYDROCORTISONE: 1 CREAM TOPICAL at 22:26

## 2022-02-13 RX ADMIN — LEVETIRACETAM 1500 MG: 100 INJECTION INTRAVENOUS at 22:27

## 2022-02-13 RX ADMIN — SODIUM CHLORIDE, PRESERVATIVE FREE 10 ML: 5 INJECTION INTRAVENOUS at 22:27

## 2022-02-13 RX ADMIN — ENOXAPARIN SODIUM 80 MG: 100 INJECTION SUBCUTANEOUS at 11:59

## 2022-02-13 RX ADMIN — LEVETIRACETAM 1500 MG: 100 INJECTION INTRAVENOUS at 09:37

## 2022-02-13 NOTE — PROGRESS NOTES
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 Upon arrival to ER he was noted to ba actively seizing and was given lorazepam, phenobarbital, Keppra, Depatoke with cessation of seizure activity. On arrival he had left gaze deviation and was moving the left upper and lowe ext. but not the right upper or lower. Head CT and CTA were completed with no acute findings but with encephalomalacia of both frontal lkobes and significant small vessel disease. UA and PCXR were NEG for infectious concerns and WBC 10. Utox was NEG.   He was transferred to [facility] [place] in stable condition for cEEG monitoring with consult to Dr. Carlos Enrique Irwin by sending facility.   Patient stable overnight and transferred out of ICU on     Interval history / Subjective:     Patient conscious and alert, answer questions, he said he feels better,  he was confused this morning and tried to get out of beds     Assessment & Plan:     Status epilepticus with seizure disorder not on home medication  -recieved valproate 1622 mg IV and  Keppra 3500 mg IV and   phenobarbital 811.2 mg IV in ER  -improved and now on keppra 1500 mg iv q 12, D5 1/2,  normal saline, prn ativan  -EEG on  no active seizure seen   -CT head no acute abnormality  -CTA head previous right carotid stent with 55% diameter stenosis by NASCET criteria  within stent due to neointimal hyperplasia. 62% diameter stenosis origin left ICA by NASCET criteria.  Mild stenosis right intradural vertebral artery, no other significant  vertebral or basilar stenosis. No evidence of intracranial large vessel occlusion or significant stenosis. -MRI brain no acute finding  -conscious and alert, answer questions  -continue seizure precaution   -check bedside swallowing, to switch his meds to po   -transferred out of ICU on 2/12  -Neurology on board     Hx of CVA, no residual neurologic deficit   -takes xarelto at home      Hx of DVT/PE  -on therapeutic lovenox, will switch to home xarelto        Code status: Full Code   Prophylaxis: Lovenox    Care Plan discussed with: Patient and Nurse  Anticipated Disposition: TBD     Hospital Problems  Never Reviewed          Codes Class Noted POA    Status epilepticus ([facility] [address] ICD-10-CM: G40.901  ICD-9-CM: 345.3  2/11/2022 Unknown                 Vital Signs:    Last 24hrs VS reviewed since prior progress note. Most recent are:  Visit Vitals  /73   Pulse 72   Temp 98.1 °F (36.7 °C)   Resp 16   Wt 83.6 kg (184 lb 4.9 oz)   SpO2 98%   BMI 24.32 kg/m²         Intake/Output Summary (Last 24 hours) at 2/13/2022 1304  Last data filed at 2/13/2022 0700  Gross per 24 hour   Intake 1707.92 ml   Output 1885 ml   Net -177.08 ml        Physical Examination:     I had a face to face encounter with this patient and independently examined them on 2/13/2022 as outlined below:          Constitutional:  No acute distress, cooperative, pleasant    ENT:  Oral mucosa moist, oropharynx benign. Resp:  CTA bilaterally. No wheezing/rhonchi/rales. No accessory muscle use. CV:  Regular rhythm, normal rate, no murmurs, gallops, rubs    GI:  Soft, non distended, non tender.  normoactive bowel sounds, no hepatosplenomegaly     Musculoskeletal:  No edema     Neurologic:  Conscious and alert, oriented to person and time, moves all extremities, CN II-XII reviewed            Data Review:    Review and/or order of clinical lab test  Review and/or order of tests in the radiology section of CPT  Review and/or order of tests in the medicine section of CPT      Labs:     Recent Labs     02/13/22  0539 02/12/22  0708   WBC 8.2 8.6   HGB 14.5 13.3   HCT 42.1 39.6   * 110*     Recent Labs     02/13/22  0539 02/12/22  0708 02/11/22  0400    137 141   K 3.8 3.6 4.7    109* 113*   CO2 25 24 25   BUN 9 15 18   CREA 0.81 0.72 1.02   * 70 95   CA 8.4* 8.4* 8.5   MG 1.9 2.1 2.5*   PHOS 1.7*  --   --      Recent Labs     02/11/22  0400   ALT 33   AP 77   TBILI 1.0   TP 6.3*   ALB 3.3*   GLOB 3.0     Recent Labs     02/10/22  1838   INR 1.1   PTP 14.0   APTT 24.5      No results for input(s): FE, TIBC, PSAT, FERR in the last 72 hours. No results found for: FOL, RBCF   No results for input(s): PH, PCO2, PO2 in the last 72 hours. No results for input(s): CPK, CKNDX, TROIQ in the last 72 hours.     No lab exists for component: CPKMB  Lab Results   Component Value Date/Time    Cholesterol, total 160 12/05/2010 03:58 AM    HDL Cholesterol 42 12/05/2010 03:58 AM    LDL, calculated 98.2 12/05/2010 03:58 AM    Triglyceride 99 12/05/2010 03:58 AM    CHOL/HDL Ratio 3.8 12/05/2010 03:58 AM     Lab Results   Component Value Date/Time    Glucose (POC) 148 (H) 02/13/2022 05:49 AM    Glucose (POC) 80 02/12/2022 06:25 PM    Glucose (POC) 67 02/12/2022 05:49 PM    Glucose (POC) 117 02/12/2022 12:12 PM    Glucose (POC) 105 02/12/2022 11:49 AM     Lab Results   Component Value Date/Time    Color YELLOW 02/10/2022 07:00 PM    Appearance CLEAR 02/10/2022 07:00 PM    Specific gravity 1.026 02/10/2022 07:00 PM    pH (UA) 5.5 02/10/2022 07:00 PM    Protein TRACE (A) 02/10/2022 07:00 PM    Glucose Negative 02/10/2022 07:00 PM    Ketone 15 (A) 02/10/2022 07:00 PM    Bilirubin Negative 02/10/2022 07:00 PM    Urobilinogen 1.0 02/10/2022 07:00 PM    Nitrites Negative 02/10/2022 07:00 PM    Leukocyte Esterase Negative 02/10/2022 07:00 PM    Epithelial cells 0 to 2 02/10/2022 07:00 PM    Bacteria Negative 02/10/2022 07:00 PM    WBC 0 to 3 02/10/2022 07:00 PM    RBC 0 to 3 02/10/2022 07:00 PM         Medications Reviewed:     Current Facility-Administered Medications   Medication Dose Route Frequency    levETIRAcetam (KEPPRA) 1,500 mg in 0.9% sodium chloride 100 mL IVPB  1,500 mg IntraVENous Q12H    dextrose 10 % infusion 125-250 mL  125-250 mL IntraVENous PRN    dextrose 5% - 0.45% NaCl with KCl 20 mEq/L infusion  75 mL/hr IntraVENous CONTINUOUS    sodium chloride (NS) flush 5-40 mL  5-40 mL IntraVENous Q8H    sodium chloride (NS) flush 5-40 mL  5-40 mL IntraVENous PRN    acetaminophen (TYLENOL) tablet 650 mg  650 mg Oral Q6H PRN    LORazepam (ATIVAN) injection 4 mg  4 mg IntraVENous Q15MIN PRN    enoxaparin (LOVENOX) injection 80 mg  1 mg/kg SubCUTAneous Q12H    hydrocortisone (CORTAID) 1 % cream   Topical Q12H    latanoprost (XALATAN) 0.005 % ophthalmic solution 1 Drop  1 Drop Both Eyes QHS     ______________________________________________________________________  EXPECTED LENGTH OF STAY: - - -  ACTUAL LENGTH OF STAY:          2                 Kat Hernandez MD

## 2022-02-13 NOTE — PROGRESS NOTES
2200: Patient found OOB on chair across the room by other ICU RN. Primary RN & Charge RN notified. Patient alert and stated he \"had to get out of bed to pass gas. \" No outward injuries. Patient safely assisted back to bed with bed alarm. Patient also moved closer to nurses' station. 2215: Sherri Martel NP notified who stated to monitor patient for further neuro changes and no need to scan at this time. Notified POA brother Ed of fall and room change. Brother expressed appreciation for the update. Post Fall Documentation      Ingrid Prince witnessed/unwitnessed fall occurred on 2/12/2022 (Date) at 200 (Time). The answers to the following questions summarize the fall: In the patient's own words,:  · What were you attempting to do when you fell? Patient stated he had to \"stand up to fart. \"   · Do you know why you fell? No  · Do you have any pain/discomfort or any other complaints? No  · Which part of your body made contact with the floor or other object? Multiple answers; patient is disoriented at baseline    Nurse:  Kim Carrillo Was this an assisted fall? no   Was fall witnessed? No   If witnessed, what part of the body made contact with the floor or other object? Unknown   Patients mental status after the fall/when found: Alert and oriented   Any apparent injury:  No apparent injury   Immediate interventions for injury/suspected injury? No interventions needed   Patient assisted back to bed? Assist X2   Name of provider notified and time, any comments? Sangeeta Feliz NP        Name of family member notified and time: Ed Brother @ [phone]      Immediate VS and physical assessment documented in flow sheets. Neuro assessment every hour x 4 (for potential head injury or unwitnessed fall) documented in flow sheets.       Mitch Oviedo RN

## 2022-02-13 NOTE — PROGRESS NOTES
Problem: Falls - Risk of  Goal: *Absence of Falls  Description: Document Antonia Barrera Fall Risk and appropriate interventions in the flowsheet. Outcome: Progressing Towards Goal  Note: Fall Risk Interventions:       Mentation Interventions: Bed/chair exit alarm    Medication Interventions: Bed/chair exit alarm,Patient to call before getting OOB    Elimination Interventions: Bed/chair exit alarm,Call light in reach,Patient to call for help with toileting needs              Problem: Patient Education: Go to Patient Education Activity  Goal: Patient/Family Education  Outcome: Progressing Towards Goal     Problem: Pressure Injury - Risk of  Goal: *Prevention of pressure injury  Description: Document Meek Scale and appropriate interventions in the flowsheet.   Outcome: Progressing Towards Goal  Note: Pressure Injury Interventions:  Sensory Interventions: Assess changes in LOC,Discuss PT/OT consult with provider    Moisture Interventions: Absorbent underpads,Check for incontinence Q2 hours and as needed    Activity Interventions: PT/OT evaluation,Pressure redistribution bed/mattress(bed type)    Mobility Interventions: Pressure redistribution bed/mattress (bed type),PT/OT evaluation    Nutrition Interventions: Document food/fluid/supplement intake    Friction and Shear Interventions: HOB 30 degrees or less                Problem: Patient Education: Go to Patient Education Activity  Goal: Patient/Family Education  Outcome: Progressing Towards Goal

## 2022-02-14 LAB
ANION GAP SERPL CALC-SCNC: 3 MMOL/L (ref 5–15)
BUN SERPL-MCNC: 9 MG/DL (ref 6–20)
BUN/CREAT SERPL: 12 (ref 12–20)
CALCIUM SERPL-MCNC: 8.4 MG/DL (ref 8.5–10.1)
CHLORIDE SERPL-SCNC: 109 MMOL/L (ref 97–108)
CO2 SERPL-SCNC: 25 MMOL/L (ref 21–32)
CREAT SERPL-MCNC: 0.77 MG/DL (ref 0.7–1.3)
ERYTHROCYTE [DISTWIDTH] IN BLOOD BY AUTOMATED COUNT: 13 % (ref 11.5–14.5)
GLUCOSE SERPL-MCNC: 188 MG/DL (ref 65–100)
HCT VFR BLD AUTO: 38.1 % (ref 36.6–50.3)
HGB BLD-MCNC: 12.6 G/DL (ref 12.1–17)
MCH RBC QN AUTO: 32.6 PG (ref 26–34)
MCHC RBC AUTO-ENTMCNC: 33.1 G/DL (ref 30–36.5)
MCV RBC AUTO: 98.7 FL (ref 80–99)
NRBC # BLD: 0 K/UL (ref 0–0.01)
NRBC BLD-RTO: 0 PER 100 WBC
PHOSPHATE SERPL-MCNC: 1.7 MG/DL (ref 2.6–4.7)
PLATELET # BLD AUTO: 107 K/UL (ref 150–400)
PMV BLD AUTO: 10.3 FL (ref 8.9–12.9)
POTASSIUM SERPL-SCNC: 3.8 MMOL/L (ref 3.5–5.1)
RBC # BLD AUTO: 3.86 M/UL (ref 4.1–5.7)
SODIUM SERPL-SCNC: 137 MMOL/L (ref 136–145)
WBC # BLD AUTO: 6.2 K/UL (ref 4.1–11.1)

## 2022-02-14 PROCEDURE — 97535 SELF CARE MNGMENT TRAINING: CPT

## 2022-02-14 PROCEDURE — 36415 COLL VENOUS BLD VENIPUNCTURE: CPT

## 2022-02-14 PROCEDURE — 65660000000 HC RM CCU STEPDOWN

## 2022-02-14 PROCEDURE — 80048 BASIC METABOLIC PNL TOTAL CA: CPT

## 2022-02-14 PROCEDURE — 84100 ASSAY OF PHOSPHORUS: CPT

## 2022-02-14 PROCEDURE — 92612 ENDOSCOPY SWALLOW (FEES) VID: CPT

## 2022-02-14 PROCEDURE — 94760 N-INVAS EAR/PLS OXIMETRY 1: CPT

## 2022-02-14 PROCEDURE — 74011000250 HC RX REV CODE- 250: Performed by: NURSE PRACTITIONER

## 2022-02-14 PROCEDURE — 97162 PT EVAL MOD COMPLEX 30 MIN: CPT

## 2022-02-14 PROCEDURE — 97165 OT EVAL LOW COMPLEX 30 MIN: CPT

## 2022-02-14 PROCEDURE — 97530 THERAPEUTIC ACTIVITIES: CPT

## 2022-02-14 PROCEDURE — 74011000258 HC RX REV CODE- 258: Performed by: PSYCHIATRY & NEUROLOGY

## 2022-02-14 PROCEDURE — 74011250636 HC RX REV CODE- 250/636: Performed by: PSYCHIATRY & NEUROLOGY

## 2022-02-14 PROCEDURE — 74011250637 HC RX REV CODE- 250/637: Performed by: HOSPITALIST

## 2022-02-14 PROCEDURE — 92610 EVALUATE SWALLOWING FUNCTION: CPT

## 2022-02-14 PROCEDURE — 85027 COMPLETE CBC AUTOMATED: CPT

## 2022-02-14 PROCEDURE — 74011250636 HC RX REV CODE- 250/636: Performed by: NURSE PRACTITIONER

## 2022-02-14 RX ADMIN — LEVETIRACETAM 1500 MG: 500 SOLUTION ORAL at 21:46

## 2022-02-14 RX ADMIN — HYDROCORTISONE: 1 CREAM TOPICAL at 08:41

## 2022-02-14 RX ADMIN — HYDROCORTISONE: 1 CREAM TOPICAL at 21:00

## 2022-02-14 RX ADMIN — ENOXAPARIN SODIUM 80 MG: 100 INJECTION SUBCUTANEOUS at 08:41

## 2022-02-14 RX ADMIN — LEVETIRACETAM 1500 MG: 100 INJECTION INTRAVENOUS at 08:59

## 2022-02-14 RX ADMIN — ENOXAPARIN SODIUM 80 MG: 100 INJECTION SUBCUTANEOUS at 21:46

## 2022-02-14 RX ADMIN — SODIUM CHLORIDE, PRESERVATIVE FREE 10 ML: 5 INJECTION INTRAVENOUS at 05:33

## 2022-02-14 RX ADMIN — LATANOPROST 1 DROP: 50 SOLUTION OPHTHALMIC at 21:54

## 2022-02-14 NOTE — PROGRESS NOTES
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 Upon arrival to ER he was noted to ba actively seizing and was given lorazepam, phenobarbital, Keppra, Depatoke with cessation of seizure activity. On arrival he had left gaze deviation and was moving the left upper and lowe ext. but not the right upper or lower. Head CT and CTA were completed with no acute findings but with encephalomalacia of both frontal lkobes and significant small vessel disease. UA and PCXR were NEG for infectious concerns and WBC 10. Utox was NEG.   He was transferred to [facility] [place] in stable condition for cEEG monitoring with consult to Dr. Cristofer Victor by sending facility.   Patient stable overnight and transferred out of ICU on     Interval history / Subjective:     Patient conscious and alert, answer questions, he said he feels better,        Assessment & Plan:     Status epilepticus with seizure disorder not on home medication  -recieved valproate 1622 mg IV and  Keppra 3500 mg IV and   phenobarbital 811.2 mg IV in ER  -improved and now on keppra 1500 mg iv q 12, D5 1/2,  normal saline, prn ativan  -EEG on  no active seizure seen   -CT head no acute abnormality  -CTA head previous right carotid stent with 55% diameter stenosis by NASCET criteria  within stent due to neointimal hyperplasia. 62% diameter stenosis origin left ICA by NASCET criteria.  Mild stenosis right intradural vertebral artery, no other significant  vertebral or basilar stenosis. No evidence of intracranial large vessel occlusion or significant stenosis. -MRI brain no acute finding  -conscious and alert, answer questions  -continue seizure precaution   -check bedside swallowing, to switch his meds to po   -transferred out of ICU on 2/12  -Neurology on board    Acute metabolic encephalopathy   -patient conscious and alert but confused  -continue neuro check and supportive care  -swallowing evaluation      Hx of CVA, no residual neurologic deficit   -takes xarelto at home      Hx of DVT/PE  -on therapeutic lovenox, will switch to home xarelto    Dysphagia   -speech consulted, recommended pureed diet/mildly thick liquid  -aspiration precaution         Code status: Full Code   Prophylaxis: Lovenox    Care Plan discussed with: Patient and Nurse  Anticipated Disposition: SNF     Hospital Problems  Never Reviewed          Codes Class Noted POA    Status epilepticus ([facility] [address] ICD-10-CM: G40.901  ICD-9-CM: 345.3  2/11/2022 Unknown                 Vital Signs:    Last 24hrs VS reviewed since prior progress note. Most recent are:  Visit Vitals  /68 (BP 1 Location: Left arm, BP Patient Position: Sitting)   Pulse 95   Temp 98.4 °F (36.9 °C)   Resp 20   Wt 83.6 kg (184 lb 4.9 oz)   SpO2 97%   BMI 24.32 kg/m²       No intake or output data in the 24 hours ending 02/14/22 1130     Physical Examination:     I had a face to face encounter with this patient and independently examined them on 2/14/2022 as outlined below:          Constitutional:  No acute distress, cooperative, pleasant    ENT:  Oral mucosa moist, oropharynx benign. Resp:  CTA bilaterally. No wheezing/rhonchi/rales. No accessory muscle use. CV:  Regular rhythm, normal rate, no murmurs, gallops, rubs    GI:  Soft, non distended, non tender.  normoactive bowel sounds, no hepatosplenomegaly     Musculoskeletal:  No edema     Neurologic:  Conscious and alert, oriented to person and time, moves all extremities, CN II-XII reviewed            Data Review:    Review and/or order of clinical lab test  Review and/or order of tests in the radiology section of CPT  Review and/or order of tests in the medicine section of CPT      Labs:     Recent Labs     02/14/22  0113 02/13/22  0539   WBC 6.2 8.2   HGB 12.6 14.5   HCT 38.1 42.1   * 116*     Recent Labs     02/14/22  0113 02/13/22  0539 02/12/22  0708    136 137   K 3.8 3.8 3.6   * 106 109*   CO2 25 25 24   BUN 9 9 15   CREA 0.77 0.81 0.72   * 157* 70   CA 8.4* 8.4* 8.4*   MG  --  1.9 2.1   PHOS 1.7* 1.7*  --      No results for input(s): ALT, AP, TBIL, TBILI, TP, ALB, GLOB, GGT, AML, LPSE in the last 72 hours. No lab exists for component: SGOT, GPT, AMYP, HLPSE  No results for input(s): INR, PTP, APTT, INREXT, INREXT in the last 72 hours. No results for input(s): FE, TIBC, PSAT, FERR in the last 72 hours. No results found for: FOL, RBCF   No results for input(s): PH, PCO2, PO2 in the last 72 hours. No results for input(s): CPK, CKNDX, TROIQ in the last 72 hours.     No lab exists for component: CPKMB  Lab Results   Component Value Date/Time    Cholesterol, total 160 12/05/2010 03:58 AM    HDL Cholesterol 42 12/05/2010 03:58 AM    LDL, calculated 98.2 12/05/2010 03:58 AM    Triglyceride 99 12/05/2010 03:58 AM    CHOL/HDL Ratio 3.8 12/05/2010 03:58 AM     Lab Results   Component Value Date/Time    Glucose (POC) 148 (H) 02/13/2022 05:49 AM    Glucose (POC) 80 02/12/2022 06:25 PM    Glucose (POC) 67 02/12/2022 05:49 PM    Glucose (POC) 117 02/12/2022 12:12 PM    Glucose (POC) 105 02/12/2022 11:49 AM     Lab Results   Component Value Date/Time    Color YELLOW 02/10/2022 07:00 PM    Appearance CLEAR 02/10/2022 07:00 PM    Specific gravity 1.026 02/10/2022 07:00 PM    pH (UA) 5.5 02/10/2022 07:00 PM    Protein TRACE (A) 02/10/2022 07:00 PM    Glucose Negative 02/10/2022 07:00 PM    Ketone 15 (A) 02/10/2022 07:00 PM    Bilirubin Negative 02/10/2022 07:00 PM Urobilinogen 1.0 02/10/2022 07:00 PM    Nitrites Negative 02/10/2022 07:00 PM    Leukocyte Esterase Negative 02/10/2022 07:00 PM    Epithelial cells 0 to 2 02/10/2022 07:00 PM    Bacteria Negative 02/10/2022 07:00 PM    WBC 0 to 3 02/10/2022 07:00 PM    RBC 0 to 3 02/10/2022 07:00 PM         Medications Reviewed:     Current Facility-Administered Medications   Medication Dose Route Frequency    levETIRAcetam (KEPPRA) 1,500 mg in 0.9% sodium chloride 100 mL IVPB  1,500 mg IntraVENous Q12H    dextrose 10 % infusion 125-250 mL  125-250 mL IntraVENous PRN    dextrose 5% - 0.45% NaCl with KCl 20 mEq/L infusion  75 mL/hr IntraVENous CONTINUOUS    sodium chloride (NS) flush 5-40 mL  5-40 mL IntraVENous Q8H    sodium chloride (NS) flush 5-40 mL  5-40 mL IntraVENous PRN    acetaminophen (TYLENOL) tablet 650 mg  650 mg Oral Q6H PRN    LORazepam (ATIVAN) injection 4 mg  4 mg IntraVENous Q15MIN PRN    enoxaparin (LOVENOX) injection 80 mg  1 mg/kg SubCUTAneous Q12H    hydrocortisone (CORTAID) 1 % cream   Topical Q12H    latanoprost (XALATAN) 0.005 % ophthalmic solution 1 Drop  1 Drop Both Eyes QHS     ______________________________________________________________________  EXPECTED LENGTH OF STAY: - - -  ACTUAL LENGTH OF STAY:          2                 Lottie Carolina MD

## 2022-02-14 NOTE — PROGRESS NOTES
Problem: Self Care Deficits Care Plan (Adult)  Goal: *Acute Goals and Plan of Care (Insert Text)  Description: FUNCTIONAL STATUS PRIOR TO ADMISSION: Patient was independent with basic ADL tasks for functional mobility. Brother assists with IADLs and driving. HOME SUPPORT: The patient lived with brother and required total assistance for IADL tasks. Occupational Therapy Goals  Initiated 2/14/2022   1. Patient will perform grooming in standing with supervision/set-up within 7 day(s). 2.  Patient will perform bathing with supervision/set-up within 7 day(s). 3.  Patient will perform lower body dressing with supervision/set-up within 7 day(s). 4.  Patient will perform toilet transfers with supervision/set-up within 7 day(s). 5.  Patient will perform all aspects of toileting with supervision/set-up within 7 day(s). 6.  Patient will participate in upper extremity therapeutic exercise/activities with supervision/set-up within 8 day(s). 7.  Patient will utilize energy conservation techniques during functional activities with verbal cues within 7 day(s). 8.  Patient will participate in [address] score by 5 points in prep for ADLs within 7 days. Outcome: Not Met   OCCUPATIONAL THERAPY EVALUATION  Patient: Ingrid Prince (20 y.o. male)  Date: 2/14/2022  Primary Diagnosis: Status epilepticus ([facility] [address] [G40.901]        Precautions:   Fall    ASSESSMENT  Based on the objective data described below, the patient presents with impaired activity tolerance, balance, mobility, generalized strength (R sided weakness) and confusion impacting his ability to complete ADL tasks. Hx of R frontal cranio, CVA, and seizures. He was admitted for AMS and seizures. Nursing cleared for therapy. Received in bed eating breakfast with extensive food in his mouth and difficulty swallowing and coughing following breakfast. Supine > sit with SBA with good sitting balance. BP stable in sitting. R sock supervision, L sock mod A. Sit > stand with min A x1-2. R UE weakness, functional.  Unable to complete Rosy Mean and full vision assessment secondary to difficulty with multi-step commands. He was able to read the board and OT's name badge. Left up in chair and removed food/drink from bedside table. Alerted nursing and SLP regarding concerns with swallowing. Recommend completion of Rosy Mean as appropriate. Current Level of Function Impacting Discharge (ADLs/self-care): UB care min A, LB care mod A, and toileting mod A. Functional Outcome Measure: The patient scored 25/100 on the Barthel Index outcome measure which is indicative of severe impairment with ADL tasks. Other factors to consider for discharge: Recommend SNF vs  with 24 hour supervision and assistance. Patient will benefit from skilled therapy intervention to address the above noted impairments. PLAN :  Recommendations and Planned Interventions: self care training, functional mobility training, therapeutic exercise, balance training, therapeutic activities, endurance activities, patient education, home safety training, and family training/education    Frequency/Duration: Patient will be followed by occupational therapy 5 times a week to address goals. Recommendation for discharge: (in order for the patient to meet his/her long term goals)  To be determined: SNF vs  24 hour supervision    This discharge recommendation:  Has been made in collaboration with the attending provider and/or case management    IF patient discharges home will need the following DME: TBD with progression and dc planning       SUBJECTIVE:   Patient stated I like [employer], have you seen it?     OBJECTIVE DATA SUMMARY:   HISTORY:   Past Medical History:   Diagnosis Date    H/O blood clots     Seizures ([facility] [address]     Stroke ([facility] [address]    No past surgical history on file.     Expanded or extensive additional review of patient history:     Home Situation  Home Environment: Private residence  # Steps to Enter: 4  One/Two Story Residence: Two story  Living Alone: No  Support Systems: Other Family Member(s) (brother)  Current DME Used/Available at Home: None    Hand dominance: Right    EXAMINATION OF PERFORMANCE DEFICITS:  Cognitive/Behavioral Status:  Neurologic State: Confused  Orientation Level: Oriented to person;Disoriented to situation;Disoriented to place  Cognition: Follows commands;Decreased attention/concentration          Vision/Perceptual:             Acuity: Able to read clock/calendar on wall without difficulty; Able to read employee name badge without difficulty    Corrective Lenses:  (glasses not available)    Range of Motion:  AROM: Generally decreased, functional       Strength:  Strength: Generally decreased, functional                Coordination:  Coordination: Generally decreased, functional  Fine Motor Skills-Upper: Left Intact; Right Intact    Gross Motor Skills-Upper: Left Intact; Right Intact    Tone & Sensation:  Tone: Normal  Sensation: Intact       Balance:  Sitting: Impaired  Sitting - Static: Good (unsupported)  Sitting - Dynamic: Fair (occasional)  Standing: Impaired  Standing - Static: Fair  Standing - Dynamic : Fair;Poor    Functional Mobility and Transfers for ADLs:  Bed Mobility:  Supine to Sit: Stand-by assistance; Additional time; Adaptive equipment;Bed Modified  Scooting: Stand-by assistance    Transfers:  Sit to Stand: Minimum assistance;Assist x2  Stand to Sit: Minimum assistance;Assist x2  Bed to Chair: Minimum assistance;Assist x2    ADL Assessment:  Feeding: Independent (spillage--coughing )  Oral Facial Hygiene/Grooming: Setup;Supervision (seated)  Bathing: Minimum assistance  Upper Body Dressing: Minimum assistance  Lower Body Dressing: Moderate assistance  Toileting: Moderate assistance       ADL Intervention and task modifications:  Feeding  Food to Mouth:  [id number] [address]: Minimum  assistance    Lower Body Dressing Assistance  Socks: Moderate assistance    Patient instructed and indicated understanding the benefits of maintaining activity tolerance, functional mobility, and independence with self care tasks during acute stay  to ensure safe return home and to baseline. Encouraged patient to increase frequency and duration OOB, be out of bed for all meals, perform daily ADLs (as approved by RN/MD regarding bathing etc), and performing functional mobility to/from bathroom. Provided education with patient on fall prevention for hospital and at home. This includes not getting OOB/chair/toilet without staff assistance, good lighting, good footwear, and recommended AD use. Patient with questionable understanding. Functional Measure:    Barthel Index:  Bathin  Bladder: 0  Bowels: 5  Groomin  Dressin  Feedin  Mobility: 0  Stairs: 0  Toilet Use: 5  Transfer (Bed to Chair and Back): 5  Total: 25/100      The Barthel ADL Index: Guidelines  1. The index should be used as a record of what a patient does, not as a record of what a patient could do. 2. The main aim is to establish degree of independence from any help, physical or verbal, however minor and for whatever reason. 3. The need for supervision renders the patient not independent. 4. A patient's performance should be established using the best available evidence. Asking the patient, friends/relatives and nurses are the usual sources, but direct observation and common sense are also important. However direct testing is not needed. 5. Usually the patient's performance over the preceding 24-48 hours is important, but occasionally longer periods will be relevant. 6. Middle categories imply that the patient supplies over 50 per cent of the effort. 7. Use of aids to be independent is allowed.     Score Interpretation (from [address] [address])    Independent   60-79 Minimally independent   40-59 Partially dependent   20-39 Very dependent <20 Totally dependent     -Owen Celaya, Barthel, D.W. (1619). Functional evaluation: the Barthel Index. [facility] ([address] (1997). The Barthel activities of daily living index: self-reporting versus actual performance in the old (> or = 75 years). Journal of [address] [place] [address] 45(7), [address] [facility], LEYLA, Quin Ferreira., Naseem Anderson. (1999). Measuring the change in disability after inpatient rehabilitation; comparison of the responsiveness of the Barthel Index and Functional [place] Measure. Journal of Neurology, Neurosurgery, and Psychiatry, 66(4), 891-388. Jonny Lin, N.[place].JAKE, TRINH Oneill, & Jb Madrid MFelicianoA. (2004) Assessment of post-stroke quality of life in cost-effectiveness studies: The usefulness of the Barthel Index and the EuroQoL-5D. Quality of Life Research, 15, 269-04         Occupational Therapy Evaluation Charge Determination   History Examination Decision-Making   LOW Complexity : Brief history review  LOW Complexity : 1-3 performance deficits relating to physical, cognitive , or psychosocial skils that result in activity limitations and / or participation restrictions  LOW Complexity : No comorbidities that affect functional and no verbal or physical assistance needed to complete eval tasks       Based on the above components, the patient evaluation is determined to be of the following complexity level: LOW   Pain Rating:  No c/o pain    Activity Tolerance:   Fair    After treatment patient left in no apparent distress:    Sitting in chair, Call bell within reach, and Bed / chair alarm activated    COMMUNICATION/EDUCATION:   The patients plan of care was discussed with: Physical therapist and Registered nurse. Home safety education was provided and the patient/caregiver indicated understanding. and Patient/family agree to work toward stated goals and plan of care.     This patients plan of care is appropriate for delegation to DANNIELLE.     Thank you for this referral.  Pete Joshua, OT  Time Calculation: 22 mins

## 2022-02-14 NOTE — PROGRESS NOTES
Problem: Mobility Impaired (Adult and Pediatric)  Goal: *Acute Goals and Plan of Care (Insert Text)  Description: FUNCTIONAL STATUS PRIOR TO ADMISSION: Patient was independent and active without use of DME.    HOME SUPPORT PRIOR TO ADMISSION: The patient lived with brother but did not require assist.    Physical Therapy Goals  Initiated 2/14/2022  1. Patient will move from supine to sit and sit to supine  in bed with modified independence within 7 day(s). 2.  Patient will transfer from bed to chair and chair to bed with modified independence using the least restrictive device within 7 day(s). 3.  Patient will perform sit to stand with modified independence within 7 day(s). 4.  Patient will ambulate with modified independence for 100 feet with the least restrictive device within 7 day(s). 5.  Patient will ascend/descend 10 stairs with single handrail(s) with supervision/set-up within 7 day(s). Outcome: Progressing Towards Goal   PHYSICAL THERAPY EVALUATION  Patient: Nolan Morton (28 y.o. male)  Date: 2/14/2022  Primary Diagnosis: Status epilepticus ([facility] [address] [G40.901]        Precautions:   Fall      ASSESSMENT  Based on the objective data described below, the patient presents with generalized weakness (baseline R sided weakness from previous CVA and craniotomy), decreased functional mobility, impaired balance and gait, confusion, perseverative behaviors s/p admission on 2/11 with AMS, and seizures. Pt received supine in bed and noted patient to have difficulty swallowing his breakfast--notified SLP and RN. Pt demonstrated slight decreased strength R hip flexion on assessment. Pt completed supine to sit with standby assist, use of bed railings. Pt completed sit<>stand with min A x 1-2 and short gait ~1-2 feet with generalized unsteadiness, increased anterior-posterior trunk sway. Pt assisted to the chair and all needs met.  Pt will continue to benefit from PT to progress mobility as tolerated and reach highest level of independence. Pt is functioning below reported baseline (independent withou AD) and may benefit from SNF placement versus home with HHPT with assistance pending progress with acute therapies. .    Current Level of Function Impacting Discharge (mobility/balance): min A x 2 sit<>stand and short gait bed to chair    Functional Outcome Measure: The patient scored Total Score: 10/28 on the Tinetti outcome measure which is indicative of high fall risk. Other factors to consider for discharge: previously ambulatory without AD, lives with brother, confused at time of eval     Patient will benefit from skilled therapy intervention to address the above noted impairments. PLAN :  Recommendations and Planned Interventions: bed mobility training, transfer training, gait training, therapeutic exercises, neuromuscular re-education, patient and family training/education, and therapeutic activities      Frequency/Duration: Patient will be followed by physical therapy:  5 times a week to address goals. Recommendation for discharge: (in order for the patient to meet his/her long term goals)  Therapy up to 5 days/week in SNF setting versus home with HHPT pending progress with acute therapies    This discharge recommendation:  Has been made in collaboration with the attending provider and/or case management    IF patient discharges home will need the following DME: to be determined (TBD)         SUBJECTIVE:   Patient stated I like [employer]. I really like [facility].     OBJECTIVE DATA SUMMARY:   HISTORY:    Past Medical History:   Diagnosis Date    H/O blood clots     Seizures ([facility] [address]     Stroke ([facility] [address]    No past surgical history on file.     Personal factors and/or comorbidities impacting plan of care:     Home Situation  Home Environment: Private residence  # Steps to Enter: 4  One/Two Story Residence: Two story  Living Alone: No  Support Systems: Other Family Member(s) (brother)  Current DME Used/Available at Home: None    EXAMINATION/PRESENTATION/DECISION MAKING:   Critical Behavior:  Neurologic State: Confused  Orientation Level: Oriented to person,Disoriented to situation,Disoriented to place  Cognition: Decreased attention/concentration     Hearing:     Skin:    Edema:   Range Of Motion:  AROM: Generally decreased, functional                       Strength:    Strength: Generally decreased, functional                    Tone & Sensation:   Tone: Normal              Sensation: Intact               Coordination:  Coordination: Generally decreased, functional  Vision:   Acuity: Able to read clock/calendar on wall without difficulty; Able to read employee name badge without difficulty  Corrective Lenses:  (glasses not available)  Functional Mobility:  Bed Mobility:     Supine to Sit: Stand-by assistance; Additional time; Adaptive equipment;Bed Modified     Scooting: Stand-by assistance  Transfers:  Sit to Stand: Minimum assistance;Assist x2  Stand to Sit: Minimum assistance;Assist x2        Bed to Chair: Minimum assistance;Assist x2              Balance:   Sitting: Impaired  Sitting - Static: Good (unsupported)  Sitting - Dynamic: Fair (occasional)  Standing: Impaired  Standing - Static: Fair  Standing - Dynamic : Fair;Poor  Ambulation/Gait Training:  Distance (ft): 2 Feet (ft)  Assistive Device: Gait belt (HHA)  Ambulation - Level of Assistance: Minimal assistance;Assist x2        Gait Abnormalities: Decreased step clearance;Shuffling gait;Trunk sway increased        Base of Support: Narrowed; Center of gravity altered     Speed/Beba: Pace decreased (<100 feet/min); Shuffled  Step Length: Right shortened;Left shortened         Functional Measure:  Tinetti test:    Sitting Balance: 1  Arises: 1  Attempts to Rise: 0  Immediate Standing Balance: 1  Standing Balance: 0  Nudged: 0  Eyes Closed: 0  Turn 360 Degrees - Continuous/Discontinuous: 0  Turn 360 Degrees - Steady/Unsteady: 0  Sitting Down: 1  Balance Score: 4 Balance total score  Indication of Gait: 0  R Step Length/Height: 0  L Step Length/Height: 0  R Foot Clearance: 1  L Foot Clearance: 1  Step Symmetry: 1  Step Continuity: 1  Path: 1  Trunk: 0  Walking Time: 1  Gait Score: 6 Gait total score  Total Score: 10/28 Overall total score         Tinetti Tool Score Risk of Falls  <19 = High Fall Risk  19-24 = Moderate Fall Risk  25-28 = Low Fall Risk  Tinetti ME. Performance-Oriented Assessment of Mobility Problems in Elderly Patients. [facility] 66; [id number]. (Scoring Description: PT Bulletin Feb. 10, 1993)    Older adults: Elsi Venegas et al, 2009; n = 1000 [facility] elderly evaluated with ABC, JUWAN, ADL, and IADL)  · Mean JUWAN score for males aged 69-68 years = 26.21(3.40)  · Mean JUWAN score for females age 69-68 years = 25.16(4.30)  · Mean JUWAN score for males over 80 years = 23.29(6.02)  · Mean JUWAN score for females over 80 years = 17.20(8.32)           Based on the above components, the patient evaluation is determined to be of the following complexity level: LOW     Pain Rating:  Pt denied pain    Activity Tolerance:   Good and Fair      After treatment patient left in no apparent distress:   Sitting in chair, Call bell within reach, Bed / chair alarm activated, and Side rails x 3    COMMUNICATION/EDUCATION:   The patients plan of care was discussed with: Occupational therapist, Speech therapist, and Registered nurse. Fall prevention education was provided and the patient/caregiver indicated understanding., Patient/family have participated as able in goal setting and plan of care. , and Patient/family agree to work toward stated goals and plan of care.     Thank you for this referral.  Delio Krishnan PT, DPT   Time Calculation: 25 mins

## 2022-02-14 NOTE — PROGRESS NOTES
Transition of Care Plan   RUR- Low 12%   DISPOSITION: SNF - Referral to be [facility] to [facility] when insurance info is received   F/U with PCP/Specialist     Transport: [facility]    CM spoke with patient's brother, Brandon Olmos, regarding insurance card information as well as SNF choice. Patient's brother plans to scan card to CM today. Discussed SNF placement/choice. Patient's brother is open to SNF - [facility] is [facility] [address] [facility], [address], [address] [facility], [phone]. CM spoke Lynnette Villa in Admissions, plan to fax referral to #[phone] when OT note is available. 12:16pm: Insurance card received and faxed to patient registration. Jareth Ahumada M.S.NEFTALY.

## 2022-02-14 NOTE — PROGRESS NOTES
Problem: Dysphagia (Adult)  Goal: *Acute Goals and Plan of Care (Insert Text)  Description: Speech Therapy Goals  Initiated 2/14/2022    1. Patient will tolerate pureed diet/mildly-thick liquids without adverse effects within 7 days. 2/14/2022 1325 by Ethan Acosta SLP  Outcome: Progressing Towards Goal  2/14/2022 1314 by Ethan Acosta SLP  Outcome: Progressing Towards Goal     SPEECH [address] EVALUATION  Patient: Lilia Gutierrez (64 y.o. male)  Date: 2/14/2022  Primary Diagnosis: Status epilepticus ([facility] [address] [G40.901]        Precautions:   Fall    ASSESSMENT :  Based on the objective data described below, the patient presents with dubious bedside presentation with concern for aspiration occurring. Pt does not reportedly have a hx of dysphagia, but noted wet vocal quality and coughing at bedside with PO with SLP. Sincerely appreciate PT and OT's attention to detail and for obtaining SLP consult. Given dubious bedside presentation, will plan to complete a Flexible Endoscopic Evaluation of Swallow (FEES) at bedside to objectively assess safety of swallow physiology. Prognosis to follow FEES. Patient will benefit from skilled intervention to address the above impairments. Patients rehabilitation potential is considered to be Fair     PLAN :  Recommendations and Planned Interventions:  --FEES today. Frequency/Duration: Patient will be followed by speech-language pathology 4 times a week to address goals. Discharge Recommendations: To Be Determined     SUBJECTIVE:   Patient stated, \"I was making a joke. I'm sorry. I'm not trying to be nasty after pt made what was very clearly a joke to SLP. OBJECTIVE:     Past Medical History:   Diagnosis Date    H/O blood clots     Seizures ([facility] [address]     Stroke ([facility] [address]    No past surgical history on file.   Prior Level of Function/Home Situation:   Home Situation  Home Environment: Private residence  # Steps to Enter: 4  One/Two Story Residence: Two story  Living Alone: No  Support Systems: Other Family Member(s) (brother)  Current DME Used/Available at Home: None  Diet prior to admission: regular diet/thin liquids  Current Diet:  easy to chew diet/thin liquids   Cognitive and Communication Status:  Neurologic State: Confused  Orientation Level: Oriented to person,Disoriented to situation,Disoriented to place  Cognition: Follows commands,Decreased attention/concentration         NOMS:   The NOMS functional outcome measure was used to quantify this patient's level of swallowing impairment. Based on the NOMS, the patient was determined to be at level 2 for swallow function       NOMS Swallowing Levels:  Level 1 (CN): NPO  Level 2 (CM): NPO but takes consistency in therapy  Level 3 (CL): Takes less than 50% of nutrition p.o. and continues with nonoral feedings; and/or safe with mod cues; and/or max diet restriction  Level 4 (CK): Safe swallow but needs mod cues; and/or mod diet restriction; and/or still requires some nonoral feeding/supplements  Level 5 (CJ): Safe swallow with min diet restriction; and/or needs min cues  Level 6 (CI): Independent with p.o.; rare cues; usually self cues; may need to avoid some foods or needs extra time  Level 7 ([address] [place] [address]): Independent for all p.o.  ROXANA. (2003). National Outcomes Measurement System (NOMS): Adult Speech-Language Pathology User's Guide. Pain:  Pain Scale 1: Numeric (0 - 10)  Pain Intensity 1: 0       After treatment:   Call bell within reach and Nursing notified    COMMUNICATION/EDUCATION:     The patient's plan of care including recommendations, planned interventions, and recommended diet changes were discussed with: Registered nurse. Patient is unable to participate in goal setting and plan of care.     Thank you for this referral.  Rubens Barfield, SLP  Time Calculation: 30 mins

## 2022-02-14 NOTE — PROGRESS NOTES
Problem: Dysphagia (Adult)  Goal: *Acute Goals and Plan of Care (Insert Text)  Description: Speech Therapy Goals  Initiated 2/14/2022    1. Patient will tolerate pureed diet/mildly-thick liquids without adverse effects within 7 days. Outcome: Progressing Towards Goal     Speech Language Pathology  Flexible Endoscopic Evaluation of Swallowing-FEES  Patient: Teresita Bhatia (3 y.o. male)  Date: 2/14/2022  Primary Diagnosis: Status epilepticus ([facility] [address] [G40.901]        Precautions:   Fall    ASSESSMENT :  Based on the objective data described below, the patient presents with moderate pharyngeal dysphagia characterized by delayed swallow initiation with pooling in the piriform sinuses with thin liquids, presumed weak pharyngeal squeeze with incomplete white out, the combination of which results in aspiration after the swallow from residual of thin liquids in the piriform sinuses mixing with already present secretions. However, with thicker liquids and puree, the secretions were cleared and no aspiration nor penetration present. At this juncture, pt's dysphagia with unclear etiology as MRI is negative for acute infarct. Do not suspect that this is a baseline issue as pt lungs were clear on chest imaging upon admission and no mention of dysphagia. Therefore, further work-up may be indicated. Recommend diet as outlined below, with likely plans to complete a Modified Barium Swallow Study for another view at the end of the week if pt remains in house. Pooling of secretions in the piriform sinus being aspirated prior to any PO      Thin liquid residue mixing with secretions and being aspirated posteriorly through the arytenoids. Patient will benefit from skilled intervention to address the above impairments.   Patient's rehabilitation potential is considered to be Guarded     PLAN :  Recommendations and Planned Interventions:  --Pureed diet/mildly-thick liquids  --good oral care  --alternate liquids/solids  --fully upright during meals    Frequency/Duration: Patient will be followed by speech-language pathology 4 times a week to address goals. Discharge Recommendations: To Be Determined     SUBJECTIVE:   Patient stated, \"My nose hurts. OBJECTIVE:     Past Medical History:   Diagnosis Date    H/O blood clots     Seizures ([facility] [address]     Stroke ([facility] [address]    No past surgical history on file. Prior Level of Function/Home Situation:   Home Situation  Home Environment: Private residence  # Steps to Enter: 4  One/Two Story Residence: Two story  Living Alone: No  Support Systems: Other Family Member(s) (brother)  Current DME Used/Available at Home: None  Diet prior to admission: presumably easy to chew diet/thin liquids  Current Diet:  easy to chew diet/thin liquids     Cognitive and Communication Status:  Neurologic State: Confused  Orientation Level: Oriented to person,Disoriented to situation,Disoriented to place  Cognition: Follows commands,Decreased attention/concentration             History/indication for procedure: [address] bedside presentation.    Lidocaine used: No  Nostril used: left  Scope Used: Ambu disposable scope  Feeding Tube Present in Nare: No  Adverse Reaction: Yes  Respiratory status: Room air        Part I:  Anatomy:       General Comments:      Palate:   WFL   Base of tongue:   WFL   Valleculae:   WFL   Epiglottis:   WFL   Arytenoids:   WFL   False vocal folds:   WFL   Vocal folds:   WFL Pyriform sinus:   Impaired: Unable to assess 2/2 copious secretions         Part II:  Laryngeal Function:    Adduction:   Full   Abduction:   Full   Arytenoid movement:   Symmetric Vocal quality:   WFL         Part III:  Secretions:    [place] Secretion Rating (0-7): 6     Location:  2 - Secretions in laryngeal vestibule (beyond healthy lubrication of mucosa) Amount:   2 - Secretions filling (%) or over spilling pyriform fossae / inter-arytenoid space Response*:  2 - Ineffective attempts to clear secretions from laryngeal vestibule   Comments (e.g., quality/texture/color): White frothy secretions present in piriform sinus. See above for picture. *Normal airway responses in the pharynx or laryngeal vestibule may include spontaneous coughing, throat clearing, and/or swallowing        Part IV:  Swallow Trials:    Subjective comments regarding oral phase of swallow: Pt without teeth and with oral weakness. Comments regarding esophageal phase of swallow: Unable to assess fully given presence of secretions and residue in piriform sinuses    Consistency: Thin liquid  Position of Bolus Pre-Swallow: Pyriform sinuses  Almo Pharyngeal Residue Severity Rating Scale: Valleculae residue: 3 - mild; 5-25%, epiglottic ligament visible and Pyriform sinus residue: 5 - severe; >50%, filled to aryepiglottic fold  Spontaneously Cleared: No  Penetration: Yes  Aspiration: Yes  Response: Cough ineffective  Rosenbek Penetration-Aspiration Scale: 7 - Material enters the airway, passes below the vocal folds, and is not ejected from the trachea, despite effort    Consistency: Mildly thick liquid and Puree  Position of Bolus Pre-Swallow: Valleculae  [place] Pharyngeal Residue Severity Rating Scale: Valleculae residue: 3 - mild; 5-25%, epiglottic ligament visible and Pyriform sinus residue: 2 - trace; 1-5%, trace coating of the mucosa  Spontaneously Cleared: Cleared with alternating liquids/purees  Penetration: No  Aspiration: No  Response: n/a  Rosenbek Penetration-Aspiration Scale: 1 - Material does not enter the airway      Dysphagia Severity Rating:   Oral phase: Mild-Moderate  Pharyngeal phase: Moderate      NOMS:   The NOMS functional outcome measure was used to quantify this patient's level of swallowing impairment.   Based on the NOMS, the patient was determined to be at level 3 for swallow function         NOMS Swallowing Levels:  Level 1 (CN): NPO  Level 2 (CM): NPO but takes consistency in therapy  Level 3 (CL): Takes less than 50% of nutrition p.o. and continues with nonoral feedings; and/or safe with mod cues; and/or max diet restriction  Level 4 (CK): Safe swallow but needs mod cues; and/or mod diet restriction; and/or still requires some nonoral feeding/supplements  Level 5 (CJ): Safe swallow with min diet restriction; and/or needs min cues  Level 6 (CI): Independent with p.o.; rare cues; usually self cues; may need to avoid some foods or needs extra time  Level 7 ([address] [place] [address]): Independent for all p.o.  ROXANA. (2003). National Outcomes Measurement System (NOMS): Adult Speech-Language Pathology User's Guide. Pain:  Pain Scale 1: Numeric (0 - 10)  Pain Intensity 1: 0       COMMUNICATION/EDUCATION:     The patient's plan of care including findings from FEES, recommendations, planned interventions, and recommended diet changes were discussed with: Registered nurse. Patient is unable to participate in goal setting and plan of care.     Thank you for this referral.  JANI Tavares  Time Calculation: 30 mins

## 2022-02-14 NOTE — PROGRESS NOTES
Bedside shift change report given to Alla Cruz RN (oncoming nurse) by Marie Richard RN (offgoing nurse). Report included the following information SBAR, Kardex, ED Summary, OR Summary, Procedure Summary, Intake/Output, MAR, Med Rec Status, Cardiac Rhythm NSR, Alarm Parameters  and Dual Neuro Assessment.

## 2022-02-14 NOTE — PROGRESS NOTES
Speech pathology note  Chart accessed due to PT and OT concern for pocketing and difficulty swallowing. Patient has never been seen by SLP in this hospital system. If formal swallowing evaluation is desired, please consult SLP. Thank you.     Angelita Adan., CCC-SLP

## 2022-02-14 NOTE — PROGRESS NOTES
SLP Contact Note    SLP evaluation complete. Am concerned for possible aspiration. Will plan to complete a Flexible Endoscopic Evaluation of Swallow (FEES) at bedside to objectively assess safety of swallow physiology. Full note to follow.       Thank you,  TAMMIE RossEd, [id number] [facility]  Speech-Language Pathologist

## 2022-02-14 NOTE — PROGRESS NOTES
SLP Contact Note    SLP FEES complete. Pt with aspiration of residue mixing with secretions in the piriform sinuses posteriorly. None present with mildly-thick and puree. Recommend mildly-thick liquids and puree consistencies with plans to complete a Modified Barium Swallow Study mid-late week to reassess, as pt with an unclear etiology for this dysphagia. Full note to follow.       Thank you,  TAMMIE CrawfordEd, [address] [facility]  Speech-Language Pathologist

## 2022-02-15 LAB
ANION GAP SERPL CALC-SCNC: 5 MMOL/L (ref 5–15)
BUN SERPL-MCNC: 8 MG/DL (ref 6–20)
BUN/CREAT SERPL: 10 (ref 12–20)
CALCIUM SERPL-MCNC: 9.1 MG/DL (ref 8.5–10.1)
CHLORIDE SERPL-SCNC: 105 MMOL/L (ref 97–108)
CO2 SERPL-SCNC: 29 MMOL/L (ref 21–32)
CREAT SERPL-MCNC: 0.82 MG/DL (ref 0.7–1.3)
ERYTHROCYTE [DISTWIDTH] IN BLOOD BY AUTOMATED COUNT: 13.2 % (ref 11.5–14.5)
GLUCOSE SERPL-MCNC: 131 MG/DL (ref 65–100)
HCT VFR BLD AUTO: 43.9 % (ref 36.6–50.3)
HGB BLD-MCNC: 15 G/DL (ref 12.1–17)
MCH RBC QN AUTO: 32.8 PG (ref 26–34)
MCHC RBC AUTO-ENTMCNC: 34.2 G/DL (ref 30–36.5)
MCV RBC AUTO: 95.9 FL (ref 80–99)
NRBC # BLD: 0 K/UL (ref 0–0.01)
NRBC BLD-RTO: 0 PER 100 WBC
PHOSPHATE SERPL-MCNC: 2.5 MG/DL (ref 2.6–4.7)
PLATELET # BLD AUTO: 130 K/UL (ref 150–400)
PMV BLD AUTO: 10.1 FL (ref 8.9–12.9)
POTASSIUM SERPL-SCNC: 3.8 MMOL/L (ref 3.5–5.1)
RBC # BLD AUTO: 4.58 M/UL (ref 4.1–5.7)
SODIUM SERPL-SCNC: 139 MMOL/L (ref 136–145)
WBC # BLD AUTO: 6 K/UL (ref 4.1–11.1)

## 2022-02-15 PROCEDURE — 65660000000 HC RM CCU STEPDOWN

## 2022-02-15 PROCEDURE — 74011000250 HC RX REV CODE- 250: Performed by: NURSE PRACTITIONER

## 2022-02-15 PROCEDURE — 36415 COLL VENOUS BLD VENIPUNCTURE: CPT

## 2022-02-15 PROCEDURE — 97535 SELF CARE MNGMENT TRAINING: CPT

## 2022-02-15 PROCEDURE — 80048 BASIC METABOLIC PNL TOTAL CA: CPT

## 2022-02-15 PROCEDURE — 84100 ASSAY OF PHOSPHORUS: CPT

## 2022-02-15 PROCEDURE — 74011250636 HC RX REV CODE- 250/636: Performed by: NURSE PRACTITIONER

## 2022-02-15 PROCEDURE — 97116 GAIT TRAINING THERAPY: CPT

## 2022-02-15 PROCEDURE — 85027 COMPLETE CBC AUTOMATED: CPT

## 2022-02-15 PROCEDURE — 74011250637 HC RX REV CODE- 250/637: Performed by: HOSPITALIST

## 2022-02-15 PROCEDURE — 92526 ORAL FUNCTION THERAPY: CPT | Performed by: SPEECH-LANGUAGE PATHOLOGIST

## 2022-02-15 RX ADMIN — ENOXAPARIN SODIUM 80 MG: 100 INJECTION SUBCUTANEOUS at 21:50

## 2022-02-15 RX ADMIN — HYDROCORTISONE: 1 CREAM TOPICAL at 21:50

## 2022-02-15 RX ADMIN — LATANOPROST 1 DROP: 50 SOLUTION OPHTHALMIC at 21:50

## 2022-02-15 RX ADMIN — SODIUM CHLORIDE, PRESERVATIVE FREE 10 ML: 5 INJECTION INTRAVENOUS at 21:52

## 2022-02-15 RX ADMIN — LEVETIRACETAM 1500 MG: 500 SOLUTION ORAL at 09:20

## 2022-02-15 RX ADMIN — ENOXAPARIN SODIUM 80 MG: 100 INJECTION SUBCUTANEOUS at 09:20

## 2022-02-15 RX ADMIN — HYDROCORTISONE: 1 CREAM TOPICAL at 10:42

## 2022-02-15 RX ADMIN — LEVETIRACETAM 1500 MG: 500 SOLUTION ORAL at 21:50

## 2022-02-15 NOTE — PROGRESS NOTES
Problem: Dysphagia (Adult)  Goal: *Acute Goals and Plan of Care (Insert Text)  Description: Speech Therapy Goals  Initiated 2/14/2022    1. Patient will tolerate pureed diet/mildly-thick liquids without adverse effects within 7 days. Outcome: Progressing Towards Goal   SPEECH LANGUAGE PATHOLOGY DYSPHAGIA TREATMENT  Patient: Annie Lainez (1 y.o. male)  Date: 2/15/2022  Diagnosis: [facility] [U89.466] <principal problem not specified>       Precautions:   Fall    ASSESSMENT:  Patient upright, alert, agreeable to PO intake. Tolerating purees free of s/s of aspiration. Voice clear today. Agree with MBS later in the week to determine readiness for diet advancement. PLAN:  Recommendations and Planned Interventions:  Puree, mildly thick  Patient continues to benefit from skilled intervention to address the above impairments. Continue treatment per established plan of care. Discharge Recommendations: To Be Determined     SUBJECTIVE:   Patient stated I'm feeling great, I love Kaylen Mike. OBJECTIVE:   Cognitive and Communication Status:  Neurologic State: Alert  Orientation Level: Oriented to person,Oriented to situation,Oriented to time  Cognition: Follows commands     Perseveration: No perseveration noted     Dysphagia Treatment:  Oral Assessment:  Oral Assessment  Dentition: Upper dentures; Lower dentures  Oral Hygiene: moist, clean  P.O.  Trials:  Patient Position: up in bed  Vocal quality prior to P.O.: No impairment  Consistency Presented: Puree  How Presented: Self-fed/presented     Bolus Acceptance: No impairment  Bolus Formation/Control: No impairment                                                                                                                                                                         Pain:  Pain Scale 1: Numeric (0 - 10)  Pain Intensity 1: 0       After treatment:   Patient left in no apparent distress in bed, Call bell within reach, and Caregiver / family present    COMMUNICATION/EDUCATION:   Patient was educated regarding current diet recommendations and plan. He verbalized understanding. The patient's plan of care including recommendations, planned interventions, and recommended diet changes were discussed with: Registered nurse.      JANI French  Time Calculation: 15 mins

## 2022-02-15 NOTE — PROGRESS NOTES
Problem: Mobility Impaired (Adult and Pediatric)  Goal: *Acute Goals and Plan of Care (Insert Text)  Description: FUNCTIONAL STATUS PRIOR TO ADMISSION: Patient was independent and active without use of DME.    HOME SUPPORT PRIOR TO ADMISSION: The patient lived with brother but did not require assist.    Physical Therapy Goals  Initiated 2/14/2022  1. Patient will move from supine to sit and sit to supine  in bed with modified independence within 7 day(s). 2.  Patient will transfer from bed to chair and chair to bed with modified independence using the least restrictive device within 7 day(s). 3.  Patient will perform sit to stand with modified independence within 7 day(s). 4.  Patient will ambulate with modified independence for 100 feet with the least restrictive device within 7 day(s). 5.  Patient will ascend/descend 10 stairs with single handrail(s) with supervision/set-up within 7 day(s). Outcome: Progressing Towards Goal   PHYSICAL THERAPY TREATMENT  Patient: Radhames Banerjee (23 y.o. male)  Date: 2/15/2022  Diagnosis: [facility] [G40.901] <principal problem not specified>       Precautions: Fall  Chart, physical therapy assessment, plan of care and goals were reviewed. ASSESSMENT  Patient continues with skilled PT services and is progressing towards goals. Pt received supine in bed and agreeable to therapy. Pt tolerated session well and making good progress towards goals. Pt continues to be limited by generalized weakness, decreased functional mobility, impaired balance and gait, increased risk for falls and dependency from baseline. 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 Pt completed sit<>stand with min A and noted generalized instability upon initial standing requiring assist to correct. Pt tolerated gait training x 35 ft with min A x 2 and bilateral HHA. Pt with short, shuffle steps, narrow VERONICA, increased trunk sway. Pt assisted back to seated in the chair with alarm set. Pt is functioning well below baseline and will benefit from inpatient rehab upon discharge to continue therapy efforts. .     Current Level of Function Impacting Discharge (mobility/balance): min A x 2 gait training x 35 ft    Other factors to consider for discharge:          PLAN :  Patient continues to benefit from skilled intervention to address the above impairments. Continue treatment per established plan of care. to address goals. Recommendation for discharge: (in order for the patient to meet his/her long term goals)  Therapy 3 hours per day 5-7 days per week    This discharge recommendation:  Has been made in collaboration with the attending provider and/or case management    IF patient discharges home will need the following DME: to be determined (TBD)       SUBJECTIVE:   Patient stated [facility]     OBJECTIVE DATA SUMMARY:   Critical Behavior:  Neurologic State: Alert  Orientation Level: Oriented to person,Oriented to situation,Oriented to time  Cognition: Follows commands     Functional Mobility Training:  Bed Mobility:     Supine to Sit: Stand-by assistance              Transfers:  Sit to Stand: Minimum assistance  Stand to Sit: Minimum assistance        Bed to Chair: Minimum assistance;Assist x2                    Balance:  Sitting: Intact  Standing: Impaired  Standing - Static: Fair  Standing - Dynamic : Fair  Ambulation/Gait Training:  Distance (ft): 35 Feet (ft)  Assistive Device: Gait belt (bilateral HHA)  Ambulation - Level of Assistance: Minimal assistance;Assist x2        Gait Abnormalities: Decreased step clearance;Shuffling gait        Base of Support: Narrowed     Speed/Beba: Pace decreased (<100 feet/min); Shuffled  Step Length: Right shortened;Left shortened       Pain Rating:  Pt denied pain    Activity Tolerance:   Good    After treatment patient left in no apparent distress:   Sitting in chair, Call bell within reach, Bed / chair alarm activated, and Side rails x 3    COMMUNICATION/COLLABORATION:   The patients plan of care was discussed with: Occupational therapist and Registered nurse.      Samia Doran PT, DPT   Time Calculation: 18 mins

## 2022-02-15 NOTE — PROGRESS NOTES
Problem: Self Care Deficits Care Plan (Adult)  Goal: *Acute Goals and Plan of Care (Insert Text)  Description: FUNCTIONAL STATUS PRIOR TO ADMISSION: Patient was independent with basic ADL tasks for functional mobility. Brother assists with IADLs and driving. HOME SUPPORT: The patient lived with brother and required total assistance for IADL tasks. Occupational Therapy Goals  Initiated 2/14/2022   1. Patient will perform grooming in standing with supervision/set-up within 7 day(s). 2.  Patient will perform bathing with supervision/set-up within 7 day(s). 3.  Patient will perform lower body dressing with supervision/set-up within 7 day(s). 4.  Patient will perform toilet transfers with supervision/set-up within 7 day(s). 5.  Patient will perform all aspects of toileting with supervision/set-up within 7 day(s). 6.  Patient will participate in upper extremity therapeutic exercise/activities with supervision/set-up within 8 day(s). 7.  Patient will utilize energy conservation techniques during functional activities with verbal cues within 7 day(s). 8.  Patient will participate in [id number] score by 5 points in prep for ADLs within 7 days. Outcome: Progressing Towards Goal   OCCUPATIONAL THERAPY TREATMENT  Patient: Katelynn Carbone (89 y.o. male)  Date: 2/15/2022  Diagnosis: Status epilepticus ([facility] [address] [G40.901] <principal problem not specified>       Precautions: Fall  Chart, occupational therapy assessment, plan of care, and goals were reviewed. ASSESSMENT  Patient continues with skilled OT services and is progressing towards goals. Exhibits greatly improved alertness and activity tolerance this date. Pt was joking with therapist t/o session and accepting of all instruction to maximize his safety and independence with LB ADL, grooming task and ambulation in room.  Continues to require HHA x2 for standing aspects of ADL routine including related functional mobility, however recommend RW trial next session. Pt washed his hands standing at sink with CGA and verbal cue for hand placement on sink for balance PRN. Pt very quick moving and accepting of cues to hasten pace to maximize safety during self-care item retrieval in bathroom, as Pt often reached beyond his VERONICA and placed himself at risk for LOB. Given independent PLOF, motivation and eagerness to engage in therapy session and progress over last visit, recommend IPR at D/C. Pt can certainly tolerate 3 hours of intensive therapy daily.  aware. Current Level of Function Impacting Discharge (ADLs): Setup to Min A with ADL. Min Assist x2 toilet transfers/bathroom mobility, yet recommend RW use next session. Other factors to consider for discharge:  Hx of R frontal craniotomy, CVA, and seizures. High fall risk. Independent PLOF. PLAN :  Patient continues to benefit from skilled intervention to address the above impairments. Continue treatment per established plan of care to address goals. Recommend with staff: Bathroom for toileting needs with x2 assist, pending progress with mobility. OOB to chair all meals. Active ADL engagement. Recommend next OT session: Continue POC. Standing ADL including item retrieval at RW level. Recommendation for discharge: (in order for the patient to meet his/her long term goals)  Therapy 3 hours per day 5-7 days per week    This discharge recommendation:  Has not yet been discussed the attending provider and/or case management    IF patient discharges home will need the following DME: Defer to rehab       SUBJECTIVE:   Patient stated  \"I'm going to the [facility] in my [facility]. . I'm just joking. \"    OBJECTIVE DATA SUMMARY:   Cognitive/Behavioral Status:  Neurologic State: Alert  Orientation Level: Oriented to person;Oriented to situation;Oriented to time  Cognition: Follows commands     Perseveration: No perseveration noted       Functional Mobility and Transfers for ADLs:  Bed Mobility:  Supine to Sit: Stand-by assistance    Transfers:  Sit to Stand: Minimum assistance  Functional Transfers  Toilet Transfer : Minimum assistance;Assist x2 (Min A x1 if using RW)  Bed to Chair: Minimum assistance;Assist x2    Balance:  Sitting: Intact  Standing: Impaired  Standing - Static: Fair  Standing - Dynamic : Fair    ADL Intervention:     Provided verbal/visual cues to ensure Pt is directly in front of surfaces before reaching beyond VERONICA to retrieval self-care items or bathroom door d/t near LOB. Challenged Pts functional reach and visual scanning during standing grooming tasks. Grooming  Position Performed: Standing  Washing Hands: Contact guard assistance  Cues: Verbal cues provided (hand placement on sink for balance PRN)    Lower Body Dressing Assistance  Socks: Set-up; Supervision (to adjust R sock)  Leg Crossed Method Used: Yes  Position Performed: Seated in chair      Pain:  Denied pain. Activity Tolerance:  Good and VSS on RA    After treatment patient left in no apparent distress:   Sitting in chair, Call bell within reach, and Bed / chair alarm activated    COMMUNICATION/COLLABORATION:   The patients plan of care was discussed with: Physical therapist, Registered nurse, and Patient .      Kusum Shah OTR/L  Time Calculation: 15 mins

## 2022-02-15 NOTE — PROGRESS NOTES
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 Upon arrival to ER he was noted to ba actively seizing and was given lorazepam, phenobarbital, Keppra, Depatoke with cessation of seizure activity. On arrival he had left gaze deviation and was moving the left upper and lowe ext. but not the right upper or lower. Head CT and CTA were completed with no acute findings but with encephalomalacia of both frontal lkobes and significant small vessel disease. UA and PCXR were NEG for infectious concerns and WBC 10. Utox was NEG.   He was transferred to [facility] [place] in stable condition for cEEG monitoring with consult to Dr. Robin Matute by sending facility.   Patient stable overnight and transferred out of ICU on     Interval history / Subjective:     Patient conscious and alert, answer questions, he said he feels better,        Assessment & Plan:     Status epilepticus with seizure disorder not on home medication  -recieved valproate 1622 mg IV and  Keppra 3500 mg IV and   phenobarbital 811.2 mg IV in ER  -improved and now on keppra 1500 mg iv po q 12 D5 ,  Discontinue normal saline, prn ativan  -EEG on  no active seizure seen   -CT head no acute abnormality  -CTA head previous right carotid stent with 55% diameter stenosis by NASCET criteria  within stent due to neointimal hyperplasia. 62% diameter stenosis origin left ICA by NASCET criteria.  Mild stenosis right intradural vertebral artery, no other significant  vertebral or basilar stenosis. No evidence of intracranial large vessel occlusion or significant stenosis. -MRI brain no acute finding  -conscious and alert, answer questions   -continue seizure precaution   -check bedside swallowing, to switch his meds to po   -transferred out of ICU on 2/12  -Neurology on board    Acute metabolic encephalopathy   -patient conscious and alert but confused  -continue neuro check and supportive care  -swallowing evaluation      Hx of CVA, no residual neurologic deficit   -resume home xarelto      Hx of DVT/PE  -on therapeutic lovenox, switched to home xarelto    Dysphagia   -speech consulted, recommended pureed diet/mildly thick liquid  -aspiration precaution         Code status: Full Code   Prophylaxis: Lovenox    Care Plan discussed with: Patient and Nurse  Anticipated Disposition: SNF     Hospital Problems  Never Reviewed          Codes Class Noted POA    Status epilepticus ([facility] [address] ICD-10-CM: G40.901  ICD-9-CM: 345.3  2/11/2022 Unknown                 Vital Signs:    Last 24hrs VS reviewed since prior progress note. Most recent are:  Visit Vitals  /79   Pulse 89   Temp 98.1 °F (36.7 °C)   Resp 18   Wt 79.3 kg (174 lb 13.2 oz)   SpO2 98%   BMI 23.07 kg/m²       No intake or output data in the 24 hours ending 02/15/22 1039     Physical Examination:     I had a face to face encounter with this patient and independently examined them on 2/15/2022 as outlined below:          Constitutional:  No acute distress, cooperative, pleasant    ENT:  Oral mucosa moist, oropharynx benign. Resp:  CTA bilaterally. No wheezing/rhonchi/rales. No accessory muscle use. CV:  Regular rhythm, normal rate, no murmurs, gallops, rubs    GI:  Soft, non distended, non tender.  normoactive bowel sounds, no hepatosplenomegaly     Musculoskeletal:  No edema     Neurologic:  Conscious and alert, oriented to person and time, moves all extremities, CN II-XII reviewed            Data Review: Review and/or order of clinical lab test  Review and/or order of tests in the radiology section of CPT  Review and/or order of tests in the medicine section of CPT      Labs:     Recent Labs     02/15/22  0436 02/14/22  0113   WBC 6.0 6.2   HGB 15.0 12.6   HCT 43.9 38.1   * 107*     Recent Labs     02/15/22  0436 02/14/22  0113 02/13/22  0539    137 136   K 3.8 3.8 3.8    109* 106   CO2 29 25 25   BUN 8 9 9   CREA 0.82 0.77 0.81   * 188* 157*   CA 9.1 8.4* 8.4*   MG  --   --  1.9   PHOS 2.5* 1.7* 1.7*     No results for input(s): ALT, AP, TBIL, TBILI, TP, ALB, GLOB, GGT, AML, LPSE in the last 72 hours. No lab exists for component: SGOT, GPT, AMYP, HLPSE  No results for input(s): INR, PTP, APTT, INREXT, INREXT in the last 72 hours. No results for input(s): FE, TIBC, PSAT, FERR in the last 72 hours. No results found for: FOL, RBCF   No results for input(s): PH, PCO2, PO2 in the last 72 hours. No results for input(s): CPK, CKNDX, TROIQ in the last 72 hours.     No lab exists for component: CPKMB  Lab Results   Component Value Date/Time    Cholesterol, total 160 12/05/2010 03:58 AM    HDL Cholesterol 42 12/05/2010 03:58 AM    LDL, calculated 98.2 12/05/2010 03:58 AM    Triglyceride 99 12/05/2010 03:58 AM    CHOL/HDL Ratio 3.8 12/05/2010 03:58 AM     Lab Results   Component Value Date/Time    Glucose (POC) 148 (H) 02/13/2022 05:49 AM    Glucose (POC) 80 02/12/2022 06:25 PM    Glucose (POC) 67 02/12/2022 05:49 PM    Glucose (POC) 117 02/12/2022 12:12 PM    Glucose (POC) 105 02/12/2022 11:49 AM     Lab Results   Component Value Date/Time    Color YELLOW 02/10/2022 07:00 PM    Appearance CLEAR 02/10/2022 07:00 PM    Specific gravity 1.026 02/10/2022 07:00 PM    pH (UA) 5.5 02/10/2022 07:00 PM    Protein TRACE (A) 02/10/2022 07:00 PM    Glucose Negative 02/10/2022 07:00 PM    Ketone 15 (A) 02/10/2022 07:00 PM    Bilirubin Negative 02/10/2022 07:00 PM    Urobilinogen 1.0 02/10/2022 07:00 PM Nitrites Negative 02/10/2022 07:00 PM    Leukocyte Esterase Negative 02/10/2022 07:00 PM    Epithelial cells 0 to 2 02/10/2022 07:00 PM    Bacteria Negative 02/10/2022 07:00 PM    WBC 0 to 3 02/10/2022 07:00 PM    RBC 0 to 3 02/10/2022 07:00 PM         Medications Reviewed:     Current Facility-Administered Medications   Medication Dose Route Frequency    levETIRAcetam (KEPPRA) oral solution 1,500 mg  1,500 mg Oral Q12H    dextrose 10 % infusion 125-250 mL  125-250 mL IntraVENous PRN    dextrose 5% - 0.45% NaCl with KCl 20 mEq/L infusion  75 mL/hr IntraVENous CONTINUOUS    sodium chloride (NS) flush 5-40 mL  5-40 mL IntraVENous Q8H    sodium chloride (NS) flush 5-40 mL  5-40 mL IntraVENous PRN    acetaminophen (TYLENOL) tablet 650 mg  650 mg Oral Q6H PRN    LORazepam (ATIVAN) injection 4 mg  4 mg IntraVENous Q15MIN PRN    enoxaparin (LOVENOX) injection 80 mg  1 mg/kg SubCUTAneous Q12H    hydrocortisone (CORTAID) 1 % cream   Topical Q12H    latanoprost (XALATAN) 0.005 % ophthalmic solution 1 Drop  1 Drop Both Eyes QHS     ______________________________________________________________________  EXPECTED LENGTH OF STAY: 2d 14h  ACTUAL LENGTH OF STAY:          3                 Meenu Glass MD

## 2022-02-15 NOTE — PROGRESS NOTES
Transition of Care Plan   RUR- Low 12%   DISPOSITION: SNF - Referrals pending with [facility] and the [address] F/U with PCP/Specialist     Transport: LUZ ADEN spoke with Chely in Admissions with the [facility] [phone] regarding referral faxed yesterday, they are not able to accept patient. Patient's brother Ed, provided further choice: [facility] [place]. Referral faxed to [facility] #[phone] G#[phone]. Admissions contact: Marcela    Referral faxed to [facility] #[phone], V#[phone]. Admissions contact: Blanche Macias    12:53pm: CM met with patient and patient's brother, Ed at bedside to discuss DC plan. Per patient's brother, patient goes to adult day provider, [facility] Tuesday, Thursday and Friday and typically works the remainder of the week at [employer]. Patient's brother is open to patient going to SNF or home health, will wait for PT/OT to work with patient this afternoon and determine.     3:01pm: Per PT, recommendation is now IPR. Dr. Ranulfo Garza in agreement. Message left for patient's brother regarding choice. Leland Uribe M.S.NEFTALY.

## 2022-02-15 NOTE — PROGRESS NOTES
Problem: Falls - Risk of  Goal: *Absence of Falls  Description: Document Mayade Counts Fall Risk and appropriate interventions in the flowsheet. Outcome: Progressing Towards Goal  Note: Fall Risk Interventions:       Mentation Interventions: Bed/chair exit alarm    Medication Interventions: Bed/chair exit alarm,Patient to call before getting OOB    Elimination Interventions: Bed/chair exit alarm,Call light in reach,Patient to call for help with toileting needs              Problem: Patient Education: Go to Patient Education Activity  Goal: Patient/Family Education  Outcome: Progressing Towards Goal     Problem: Pressure Injury - Risk of  Goal: *Prevention of pressure injury  Description: Document Meek Scale and appropriate interventions in the flowsheet.   Outcome: Progressing Towards Goal  Note: Pressure Injury Interventions:  Sensory Interventions: Assess changes in LOC,Discuss PT/OT consult with provider    Moisture Interventions: Absorbent underpads,Check for incontinence Q2 hours and as needed    Activity Interventions: PT/OT evaluation,Pressure redistribution bed/mattress(bed type)    Mobility Interventions: Pressure redistribution bed/mattress (bed type),PT/OT evaluation    Nutrition Interventions: Document food/fluid/supplement intake    Friction and Shear Interventions: HOB 30 degrees or less

## 2022-02-16 VITALS
SYSTOLIC BLOOD PRESSURE: 109 MMHG | TEMPERATURE: 97.7 F | BODY MASS INDEX: 23.24 KG/M2 | DIASTOLIC BLOOD PRESSURE: 80 MMHG | HEART RATE: 85 BPM | RESPIRATION RATE: 18 BRPM | OXYGEN SATURATION: 85 % | WEIGHT: 176.15 LBS

## 2022-02-16 LAB
ANION GAP SERPL CALC-SCNC: 5 MMOL/L (ref 5–15)
BUN SERPL-MCNC: 11 MG/DL (ref 6–20)
BUN/CREAT SERPL: 15 (ref 12–20)
CALCIUM SERPL-MCNC: 9.3 MG/DL (ref 8.5–10.1)
CHLORIDE SERPL-SCNC: 107 MMOL/L (ref 97–108)
CO2 SERPL-SCNC: 26 MMOL/L (ref 21–32)
CREAT SERPL-MCNC: 0.75 MG/DL (ref 0.7–1.3)
ERYTHROCYTE [DISTWIDTH] IN BLOOD BY AUTOMATED COUNT: 13.1 % (ref 11.5–14.5)
GLUCOSE SERPL-MCNC: 95 MG/DL (ref 65–100)
HCT VFR BLD AUTO: 42.5 % (ref 36.6–50.3)
HGB BLD-MCNC: 14.3 G/DL (ref 12.1–17)
MCH RBC QN AUTO: 32.4 PG (ref 26–34)
MCHC RBC AUTO-ENTMCNC: 33.6 G/DL (ref 30–36.5)
MCV RBC AUTO: 96.2 FL (ref 80–99)
NRBC # BLD: 0 K/UL (ref 0–0.01)
NRBC BLD-RTO: 0 PER 100 WBC
PHOSPHATE SERPL-MCNC: 3.2 MG/DL (ref 2.6–4.7)
PLATELET # BLD AUTO: 128 K/UL (ref 150–400)
PMV BLD AUTO: 10.2 FL (ref 8.9–12.9)
POTASSIUM SERPL-SCNC: 3.8 MMOL/L (ref 3.5–5.1)
RBC # BLD AUTO: 4.42 M/UL (ref 4.1–5.7)
SODIUM SERPL-SCNC: 138 MMOL/L (ref 136–145)
WBC # BLD AUTO: 6.1 K/UL (ref 4.1–11.1)

## 2022-02-16 PROCEDURE — 97535 SELF CARE MNGMENT TRAINING: CPT

## 2022-02-16 PROCEDURE — 80048 BASIC METABOLIC PNL TOTAL CA: CPT

## 2022-02-16 PROCEDURE — 85027 COMPLETE CBC AUTOMATED: CPT

## 2022-02-16 PROCEDURE — 74011000250 HC RX REV CODE- 250: Performed by: NURSE PRACTITIONER

## 2022-02-16 PROCEDURE — 36415 COLL VENOUS BLD VENIPUNCTURE: CPT

## 2022-02-16 PROCEDURE — 74011250636 HC RX REV CODE- 250/636: Performed by: NURSE PRACTITIONER

## 2022-02-16 PROCEDURE — 74011250637 HC RX REV CODE- 250/637: Performed by: HOSPITALIST

## 2022-02-16 PROCEDURE — 84100 ASSAY OF PHOSPHORUS: CPT

## 2022-02-16 PROCEDURE — 92526 ORAL FUNCTION THERAPY: CPT

## 2022-02-16 RX ORDER — HYDROCORTISONE 1 %
CREAM (GRAM) TOPICAL EVERY 12 HOURS
Qty: 30 G | Refills: 0 | Status: SHIPPED | OUTPATIENT
Start: 2022-02-16

## 2022-02-16 RX ADMIN — HYDROCORTISONE: 1 CREAM TOPICAL at 09:44

## 2022-02-16 RX ADMIN — SODIUM CHLORIDE, PRESERVATIVE FREE 10 ML: 5 INJECTION INTRAVENOUS at 05:23

## 2022-02-16 RX ADMIN — ENOXAPARIN SODIUM 80 MG: 100 INJECTION SUBCUTANEOUS at 09:42

## 2022-02-16 RX ADMIN — LEVETIRACETAM 1500 MG: 500 SOLUTION ORAL at 09:42

## 2022-02-16 NOTE — PROGRESS NOTES
Problem: Dysphagia (Adult)  Goal: *Acute Goals and Plan of Care (Insert Text)  Description: Speech Therapy Goals  Initiated 2/14/2022    1. Patient will tolerate pureed diet/mildly-thick liquids without adverse effects within 7 days. Outcome: Progressing Towards Goal     SPEECH LANGUAGE PATHOLOGY DYSPHAGIA TREATMENT  Patient: Teresita Bhatia (11 y.o. male)  Date: 2/16/2022  Diagnosis: [facility] [X75.469] <principal problem not specified>       Precautions: aspiration, Fall    ASSESSMENT:  Patient with good tolerance of purees and mildly thick liquids with no overt s/s aspiration observed. Patient had dentures today so solid trialed, however patient with prolonged mastication, delayed posterior propulsion while dancing and talking, and R pocketing. Do not yet recommend diet liberalization. PLAN:  Recommendations and Planned Interventions:  -Continue puree/mildly thick liquid diet  -Note plan for discharge to [facility] today. If patient remains in hospital, will plan on MBS tomorrow. However, this can be completed at [facility] upon discharge as well  Patient continues to benefit from skilled intervention to address the above impairments. Continue treatment per established plan of care. Discharge Recommendations:  Inpatient Rehab     SUBJECTIVE:   Patient stated Braedenfelicita Velez. I love you.     OBJECTIVE:   Cognitive and Communication Status:  Neurologic State: Alert  Orientation Level: Oriented to person  Cognition: Decreased attention/concentration     Perseveration: No perseveration noted     Dysphagia Treatment:  Oral Assessment:  Oral Assessment  Dentition: Upper & lower dentures  P.O. Trials:  Patient Position: upright in bed  Vocal quality prior to P.O.: No impairment  Consistency Presented: Puree;Nectar thick liquid; Solid; Thin liquid  How Presented: Self-fed/presented;Straw;Spoon     Bolus Acceptance: No impairment  Bolus Formation/Control: Impaired  Type of Impairment: Delayed  Propulsion: Delayed (# of seconds)  Oral Residue: Right;Pocketing        Aspiration Signs/Symptoms: Change vocal quality           After treatment:   Patient left in no apparent distress in bed, Call bell within reach and Nursing notified    COMMUNICATION/EDUCATION:   Patient was educated regarding his deficit(s) of dysphagia. He demonstrated Guarded understanding as evidenced by confusion. The patient's plan of care including recommendations, planned interventions, and recommended diet changes were discussed with: Registered nurse and OT.      Clive Jones SLP  Time Calculation: 12 mins

## 2022-02-16 NOTE — PROGRESS NOTES
Problem: Falls - Risk of  Goal: *Absence of Falls  Description: Document Doug Guzmán Fall Risk and appropriate interventions in the flowsheet. Outcome: Progressing Towards Goal  Note: Fall Risk Interventions:  Mobility Interventions: Bed/chair exit alarm    Mentation Interventions: Bed/chair exit alarm    Medication Interventions: Bed/chair exit alarm    Elimination Interventions: Bed/chair exit alarm,Call light in reach              Problem: Patient Education: Go to Patient Education Activity  Goal: Patient/Family Education  Outcome: Progressing Towards Goal     Problem: Pressure Injury - Risk of  Goal: *Prevention of pressure injury  Description: Document Meek Scale and appropriate interventions in the flowsheet.   Outcome: Progressing Towards Goal  Note: Pressure Injury Interventions:  Sensory Interventions: Assess changes in LOC,Discuss PT/OT consult with provider    Moisture Interventions: Absorbent underpads,Check for incontinence Q2 hours and as needed    Activity Interventions: Increase time out of bed    Mobility Interventions: HOB 30 degrees or less    Nutrition Interventions: Offer support with meals,snacks and hydration    Friction and Shear Interventions: HOB 30 degrees or less                Problem: Patient Education: Go to Patient Education Activity  Goal: Patient/Family Education  Outcome: Progressing Towards Goal     Problem: Patient Education: Go to Patient Education Activity  Goal: Patient/Family Education  Outcome: Progressing Towards Goal     Problem: Patient Education: Go to Patient Education Activity  Goal: Patient/Family Education  Outcome: Progressing Towards Goal     Problem: Patient Education: Go to Patient Education Activity  Goal: Patient/Family Education  Outcome: Progressing Towards Goal

## 2022-02-16 NOTE — PROGRESS NOTES
Report Called to Jose at [facility]. I have reviewed discharge instructions with the patient. The patient verbalized understanding. Transport to [facility] is being provided by [facility]. [facility] aware.

## 2022-02-16 NOTE — PROGRESS NOTES
Transition of Care Plan   RUR- Low 12%   DISPOSITION: IPR - [facility] [place] F/U with PCP/Specialist     Transport: [facility] response 3pm    CM spoke with patient's brother on 2/15 and he provided the following IPR choices: [facility] ([facility]), [facility]. MARKIE spoke with Beryl Barrow #[phone], liaison at [facility], who stated she was reviewing patient's information and plans to return call after 10am this morning regarding acceptance/bed availability. [facility] is on will call for today to [facility] in the case they are able to accept. MARKIE spoke with [facility] liaison, Irvin Lopez, who stated they could accept patient but would not have a bed until tomorrow 2/17.     10:36am: CM received return call from Beryl Barrow at [facility], they are able to accept patient today. [facility] requested for 12pm.    EMTALA on chart. Accepting physician: Dr. Sanket Ferguson  Call report #[phone] - Cele Negron is RN  MARKIE faxed DC summary to #[phone]    10:49am: [facility] not able to transport patient until 7:15pm. [facility] unable to transport patient today. MARKIE spoke with [facility] who is able to transport patient at 3pm today to [facility]. Medicare pt has received, reviewed, and signed 2nd IM letter informing them of their right to appeal the discharge. Signed copy has been placed on pt bedside chart. ALEXSANDER Baig.

## 2022-02-16 NOTE — DISCHARGE INSTRUCTIONS
Discharge SNF/Rehab Instructions/LTAC       PATIENT ID: Derrick Guillory  MRN: [id number]   YOB: 1960    DATE OF ADMISSION: 2/11/2022 12:40 AM    DATE OF DISCHARGE: 2/16/2022    PRIMARY CARE PROVIDER: Unknown, Provider, DPM       ATTENDING PHYSICIAN: Duyen Madrigal MD  DISCHARGING PROVIDER: Wanda Constantino MD     To contact this individual call [phone] and ask the  to page. If unavailable ask to be transferred the Adult Hospitalist Department. 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 Upon arrival to ER he was noted to ba actively seizing and was given lorazepam, phenobarbital, Keppra, Depatoke with cessation of seizure activity. On arrival he had left gaze deviation and was moving the left upper and lowe ext. but not the right upper or lower. Head CT and CTA were completed with no acute findings but with encephalomalacia of both frontal lkobes and significant small vessel disease. UA and PCXR were NEG for infectious concerns and WBC 10. Utox was NEG.   He was transferred to [facility] [place] in stable condition for EEG monitoring with consult to Dr. Danyel Toure by sending facility.   Patient stable overnight and transferred out of ICU on 2/12    Status epilepticus with seizure disorder not on home medication  -recieved valproate 1622 mg IV and  Keppra 3500 mg IV and   phenobarbital 811.2 mg IV in ER  -improved and now on keppra 1500 mg iv po q 12 D5 1/2,  Discontinue normal saline, prn ativan  -EEG on 2/11 no active seizure seen   -CT head no acute abnormality  -CTA head previous right carotid stent with 55% diameter stenosis by NASCET criteria  within stent due to neointimal hyperplasia.  62% diameter stenosis origin left ICA by NASCET criteria. Mild stenosis right intradural vertebral artery, no other significant  vertebral or basilar stenosis. No evidence of intracranial large vessel occlusion or significant stenosis. -MRI brain no acute finding  -conscious and alert, answer questions   -continue seizure precaution   -check bedside swallowing, to switch his meds to po   -transferred out of ICU on 2/12  -Neurology on board  Acute metabolic encephalopathy   -patient conscious and alert, answer questions  -continue neuro check and supportive care  Hx of CVA, no residual neurologic deficit   -resume home xarelto   Hx of DVT/PE  -on therapeutic lovenox, switched to home xarelto  Dysphagia   -speech consulted, recommended pureed diet/mildly thick liquid  -aspiration precaution         Code status: Full Code   Prophylaxis: Lovenox      DISCHARGE DIAGNOSES / PLAN:      Status epilepticus with seizure disorder not on home medication  -continue keppra 1500 mg solution q 12 hrs  -conscious and alert, answer questions   -continue seizure precaution   -outpatient follow up with Neurology   Acute metabolic encephalopathy   -improved, conscious and alert, oriented to person and place, answer questions  -continue neuro check and supportive care  Hx of CVA, no residual neurologic deficit   -continue xarelto   Hx of DVT/PE  -continue xarelto  Dysphagia   -continue pureed diet/mildly thick liquid  -aspiration precaution  -speech therapy re evaluation at Inpatient Rehab     PENDING TEST RESULTS:   At the time of discharge the following test results are still pending: None    FOLLOW UP APPOINTMENTS:    Follow-up Information     Follow up With Specialties Details Why Contact Info   1. Primary Care Physician   In one week  Patient not available to ask        2.  Kalyan Dhillon MD, Neurology in 3-4 weeks    ADDITIONAL CARE RECOMMENDATIONS:      DIET: Dysphagia diet-pureed, mildly thick    TUBE FEEDING INSTRUCTIONS: None    OXYGEN / BiPAP SETTINGS: None    ACTIVITY: Activity as tolerated    WOUND CARE: None    EQUIPMENT needed: None      DISCHARGE MEDICATIONS:   See Medication Reconciliation Form      NOTIFY YOUR PHYSICIAN FOR ANY OF THE FOLLOWING:   Fever over 101 degrees for 24 hours. Chest pain, shortness of breath, fever, chills, nausea, vomiting, diarrhea, change in mentation, falling, weakness, bleeding. Severe pain or pain not relieved by medications. Or, any other signs or symptoms that you may have questions about.     DISPOSITION:    Home With:   OT  PT  HH  RN       SNF/Inpatient Rehab/LTAC   x Independent/assisted living    Hospice    Other:       PATIENT CONDITION AT DISCHARGE:     Functional status    Poor    x Deconditioned      Independent      Cognition   x  Lucid     Forgetful     Dementia      Catheters/lines (plus indication)    Arevalo     PICC     PEG    x None      Code status    x Full code     DNR      PHYSICAL EXAMINATION AT DISCHARGE:   Refer to Progress Note       CHRONIC MEDICAL DIAGNOSES:  Problem List as of 2/16/2022 Never Reviewed          Codes Class Noted - Resolved    [facility] ICD-10-CM: G40.901  ICD-9-CM: 345.3  2/11/2022 - Present                CDMP Checked:   Yes x     PROBLEM LIST Updated:  Yes x         Signed:   Clinton Batres MD  2/16/2022  11:22 AM

## 2022-02-16 NOTE — DISCHARGE SUMMARY
Discharge Summary       PATIENT ID: Qamar Odom  MRN: [id number]   YOB: 1960    DATE OF ADMISSION: 2/11/2022 12:40 AM    DATE OF DISCHARGE: 2/16/2022   PRIMARY CARE PROVIDER: Unknown, Provider, ELIO     ATTENDING PHYSICIAN: ASHLY  DISCHARGING PROVIDER: Colonel Noé MD    To contact this individual call [phone] and ask the  to page. If unavailable ask to be transferred the Adult Hospitalist Department. 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 Upon arrival to ER he was noted to ba actively seizing and was given lorazepam, phenobarbital, Keppra, Depatoke with cessation of seizure activity. On arrival he had left gaze deviation and was moving the left upper and lowe ext. but not the right upper or lower. Head CT and CTA were completed with no acute findings but with encephalomalacia of both frontal lkobes and significant small vessel disease. UA and PCXR were NEG for infectious concerns and WBC 10. Utox was NEG.   He was transferred to [facility] in stable condition for EEG monitoring with consult to Dr. Dede De La Rosa by sending facility.   Patient stable overnight and transferred out of ICU on 2/12    Status epilepticus with seizure disorder not on home medication  -recieved valproate 1622 mg IV and  Keppra 3500 mg IV and   phenobarbital 811.2 mg IV in ER  -improved and now on keppra 1500 mg iv po q 12 D5 1/2,  Discontinue normal saline, prn ativan  -EEG on 2/11 no active seizure seen   -CT head no acute abnormality  -CTA head previous right carotid stent with 55% diameter stenosis by NASCET criteria  within stent due to neointimal hyperplasia.  62% diameter stenosis origin left ICA by NASCET criteria. Mild stenosis right intradural vertebral artery, no other significant  vertebral or basilar stenosis. No evidence of intracranial large vessel occlusion or significant stenosis. -MRI brain no acute finding  -conscious and alert, answer questions   -continue seizure precaution   -check bedside swallowing, to switch his meds to po   -transferred out of ICU on 2/12  -Neurology on board  Acute metabolic encephalopathy   -patient conscious and alert, answer questions  -continue neuro check and supportive care  Hx of CVA, no residual neurologic deficit   -resume home xarelto   Hx of DVT/PE  -on therapeutic lovenox, switched to home xarelto  Dysphagia   -speech consulted, recommended pureed diet/mildly thick liquid  -aspiration precaution         Code status: Full Code   Prophylaxis: Lovenox      DISCHARGE DIAGNOSES / PLAN:      Status epilepticus with seizure disorder not on home medication  -continue keppra 1500 mg solution q 12 hrs  -conscious and alert, answer questions   -continue seizure precaution   -outpatient follow up with Neurology   Acute metabolic encephalopathy   -improved, conscious and alert, oriented to person and place, answer questions  -continue neuro check and supportive care  Hx of CVA, no residual neurologic deficit   -continue xarelto   Hx of DVT/PE  -continue xarelto  Dysphagia   -continue pureed diet/mildly thick liquid  -aspiration precaution  -speech therapy re evaluation at Inpatient Rehab     PENDING TEST RESULTS:   At the time of discharge the following test results are still pending: None    FOLLOW UP APPOINTMENTS:    Follow-up Information     Follow up With Specialties Details Why Contact Info   1. Primary Care Physician   In one week  Patient not available to ask        2. Gary So MD, Neurology in 3-4 weeks       NOTIFY YOUR PHYSICIAN FOR ANY OF THE FOLLOWING:   Fever over 101 degrees for 24 hours.    Chest pain, shortness of breath, fever, chills, nausea, vomiting, diarrhea, change in mentation, falling, weakness, bleeding. Severe pain or pain not relieved by medications, as well as any other signs or symptoms that you may have questions about. DIET: Dysphagia diet-pureed, mildly thick    ACTIVITY: Activity as tolerated    EQUIPMENT needed: None     DISCHARGE MEDICATIONS:  Current Discharge Medication List      START taking these medications    Details   levETIRAcetam (KEPPRA) 100 mg/ml soln oral solution Take 15 mL by mouth every twelve (12) hours for 30 days. Qty: 900 mL, Refills: 0  Start date: 2/16/2022, End date: 3/18/2022      hydrocortisone (CORTAID) 1 % topical cream Apply  to affected area every twelve (12) hours. use thin layer  Qty: 30 g, Refills: 0  Start date: 2/16/2022         CONTINUE these medications which have NOT CHANGED    Details   bimatoprost (LUMIGAN) 0.01 % ophthalmic drops Administer 1 Drop to both eyes every evening.      multivitamin (ONE A DAY) tablet Take 1 Tablet by mouth daily. folic acid (FOLVITE) 1 mg tablet Take 1 mg by mouth daily. Xarelto 15 mg tab tablet       tamsulosin (FLOMAX) 0.4 mg capsule Take 0.4 mg by mouth daily.              DISPOSITION:    Home With:   OT  PT  HH  RN       Long term SNF/Inpatient Rehab   x Independent/assisted living    Hospice    Other:       PATIENT CONDITION AT DISCHARGE:     Functional status    Poor    x Deconditioned     Independent      Cognition   x  Lucid     Forgetful     Dementia      Catheters/lines (plus indication)    Arevalo     PICC     PEG    x None      Code status   x  Full code     DNR      PHYSICAL EXAMINATION AT DISCHARGE:  Patient Vitals for the past 24 hrs:   Temp Pulse Resp BP SpO2   02/16/22 1000 -- 85 -- -- --   02/16/22 0955 97.7 °F (36.5 °C) 85 18 109/80 --   02/16/22 0610 97.6 °F (36.4 °C) 75 15 102/67 (!) 85 %   02/16/22 0600 -- 72 -- -- --   02/16/22 0400 -- 71 -- -- --   02/16/22 0210 97.3 °F (36.3 °C) 78 19 125/84 (!) 89 %   02/16/22 0200 -- 83 -- -- --   02/15/22 2210 98.9 °F (37.2 °C) 81 19 117/79 97 %   02/15/22 2200 -- 82 -- -- --   02/15/22 2000 -- 82 -- -- --   02/15/22 1800 -- 80 -- -- --     General:          Alert, cooperative, no distress, appears stated age. HEENT:           Atraumatic, anicteric sclerae, pink conjunctivae                          No oral ulcers, mucosa moist, throat clear, dentition fair  Neck:               Supple, symmetrical  Lungs:             Clear to auscultation bilaterally. No Wheezing or Rhonchi. No rales. Chest wall:      No tenderness  No Accessory muscle use. Heart:              Regular  rhythm,  No  murmur   No edema  Abdomen:        Soft, non-tender. Not distended. Bowel sounds normal  Extremities:     No cyanosis. No clubbing,                            Skin turgor normal, Capillary refill normal  Skin:                Not pale. Not Jaundiced  No rashes   Psych:             Not anxious or agitated.   Neurologic:      Alert, moves all extremities, answers questions and responds to commands       Recent Results (from the past 24 hour(s))   CBC W/O DIFF    Collection Time: 02/16/22  5:20 AM   Result Value Ref Range    WBC 6.1 4.1 - 11.1 K/uL    RBC 4.42 4.10 - 5.70 M/uL    HGB 14.3 12.1 - 17.0 g/dL    HCT 42.5 36.6 - 50.3 %    MCV 96.2 80.0 - 99.0 FL    MCH 32.4 26.0 - 34.0 PG    MCHC 33.6 30.0 - 36.5 g/dL    RDW 13.1 11.5 - 14.5 %    PLATELET 541 (L) 337 - 400 K/uL    MPV 10.2 8.9 - 12.9 FL    NRBC 0.0 0  WBC    ABSOLUTE NRBC 0.00 0.00 - 6.96 K/uL   METABOLIC PANEL, BASIC    Collection Time: 02/16/22  5:20 AM   Result Value Ref Range    Sodium 138 136 - 145 mmol/L    Potassium 3.8 3.5 - 5.1 mmol/L    Chloride 107 97 - 108 mmol/L    CO2 26 21 - 32 mmol/L    Anion gap 5 5 - 15 mmol/L    Glucose 95 65 - 100 mg/dL    BUN 11 6 - 20 MG/DL    Creatinine 0.75 0.70 - 1.30 MG/DL    BUN/Creatinine ratio 15 12 - 20      GFR est AA >60 >60 ml/min/1.73m2    GFR est non-AA >60 >60 ml/min/1.73m2    Calcium 9.3 8.5 - 10.1 MG/DL PHOSPHORUS    Collection Time: 02/16/22  5:20 AM   Result Value Ref Range    Phosphorus 3.2 2.6 - 4.7 MG/DL     CHRONIC MEDICAL DIAGNOSES:  Problem List as of 2/16/2022 Never Reviewed          Codes Class Noted - Resolved    Status epilepticus ([facility] ICD-10-CM: G40.901  ICD-9-CM: 345.3  2/11/2022 - Present              Greater than 48 minutes were spent with the patient on counseling and coordination of care    Signed:   Niki Betts MD  2/16/2022  11:31 AM

## 2022-02-16 NOTE — PROGRESS NOTES
Problem: Self Care Deficits Care Plan (Adult)  Goal: *Acute Goals and Plan of Care (Insert Text)  Description: FUNCTIONAL STATUS PRIOR TO ADMISSION: Patient was independent with basic ADL tasks for functional mobility. Brother assists with IADLs and driving. HOME SUPPORT: The patient lived with brother and required total assistance for IADL tasks. Occupational Therapy Goals  Initiated 2/14/2022   1. Patient will perform grooming in standing with supervision/set-up within 7 day(s). 2.  Patient will perform bathing with supervision/set-up within 7 day(s). 3.  Patient will perform lower body dressing with supervision/set-up within 7 day(s). 4.  Patient will perform toilet transfers with supervision/set-up within 7 day(s). 5.  Patient will perform all aspects of toileting with supervision/set-up within 7 day(s). 6.  Patient will participate in upper extremity therapeutic exercise/activities with supervision/set-up within 8 day(s). 7.  Patient will utilize energy conservation techniques during functional activities with verbal cues within 7 day(s). 8.  Patient will participate in [id number] score by 5 points in prep for ADLs within 7 days. Outcome: Progressing Towards Goal   OCCUPATIONAL THERAPY TREATMENT  Patient: Leatha Venegas (28 y.o. male)  Date: 2/16/2022  Diagnosis: Status epilepticus ([facility] [G40.901] <principal problem not specified>       Precautions: Fall  Chart, occupational therapy assessment, plan of care, and goals were reviewed. ASSESSMENT  Patient continues with skilled OT services and is progressing towards goals. Nursing cleared for therapy, received in chair. He was oriented to person and place and increased command follow from OT maryam when this writer worked with him last.  Initially perseverating on being in [place] when he lives in [place]. Expressing need for urination, ambulated with min A with HHA, frequently reaching out for support from the environment. Standing for urination with CGA and hand hygiene at sink with min A for balance and cues for rinsing soap from hands. He participated with bathing at chair level with setup/supervision, gown mgmt with min A    Current Level of Function Impacting Discharge (ADLs): UB care min A, toileting min A    Other factors to consider for discharge: Patient continues to require assistance with functional mobility and balance as well as vc for safety, full completion of task. He is below baseline as he was working at [employer] and attending a day center program.  Recommend IPR for additional rehab services. PLAN :  Patient continues to benefit from skilled intervention to address the above impairments. Continue treatment per established plan of care to address goals. Recommend with staff: min A for toileting with gait belt and do not leave unattended on toilet    Recommend next OT session: per POC    Recommendation for discharge: (in order for the patient to meet his/her long term goals)  Therapy up to 5 days/week in SNF setting    This discharge recommendation:  Has been made in collaboration with the attending provider and/or case management    IF patient discharges home will need the following DME: TBD IPR       SUBJECTIVE:   Patient stated I ordered a [employer] wallet.  I like the [employer]    OBJECTIVE DATA SUMMARY:   Cognitive/Behavioral Status:  Neurologic State: Alert  Orientation Level: Oriented to person  Cognition: Decreased attention/concentration             Functional Mobility and Transfers for ADLs:  Bed Mobility:  Supine to Sit: Stand-by assistance  Scooting: Stand-by assistance    Transfers:  Sit to Stand: Minimum assistance  Functional Transfers  Bathroom Mobility: Minimum assistance  Bed to Chair: Minimum assistance    Balance:  Sitting: Intact  Sitting - Static: Good (unsupported)  Sitting - Dynamic: Good (unsupported)  Standing: Impaired  Standing - Static: Fair  Standing - Dynamic : Fair    ADL Intervention:   Patient instructed and indicated understanding the benefits of maintaining activity tolerance, functional mobility, and independence with self care tasks during acute stay  to ensure safe return home and to baseline. Encouraged patient to increase frequency and duration OOB, be out of bed for all meals, perform daily ADLs (as approved by RN/MD regarding bathing etc), and performing functional mobility to/from bathroom with staff. Grooming  Washing Hands: Contact guard assistance (vc for washing soap from hands)    Upper Body Bathing  Bathing Assistance: Set-up  Position Performed: Seated in chair    Lower Body Bathing  Lower Body : Minimum assistance  Position Performed: Seated in chair    Upper [address]: Minimum  assistance     Toileting  Bladder Hygiene: Contact guard assistance- standing       Pain:  No c/o pain    Activity Tolerance:   Fair    After treatment patient left in no apparent distress:   Sitting in chair, Call bell within reach, and Bed / chair alarm activated    COMMUNICATION/COLLABORATION:   The patients plan of care was discussed with: Registered nurse.      Aureliano Mchugh OT  Time Calculation: 30 mins

## 2022-02-16 NOTE — PROGRESS NOTES
Problem: Falls - Risk of  Goal: *Absence of Falls  Description: Document Margi Vorater Fall Risk and appropriate interventions in the flowsheet. Outcome: Resolved/Met  Note: Fall Risk Interventions:  Mobility Interventions: Bed/chair exit alarm    Mentation Interventions: Bed/chair exit alarm    Medication Interventions: Bed/chair exit alarm    Elimination Interventions: Bed/chair exit alarm,Call light in reach              Problem: Patient Education: Go to Patient Education Activity  Goal: Patient/Family Education  Outcome: Resolved/Met     Problem: Pressure Injury - Risk of  Goal: *Prevention of pressure injury  Description: Document Meek Scale and appropriate interventions in the flowsheet.   Outcome: Resolved/Met  Note: Pressure Injury Interventions:  Sensory Interventions: Assess changes in LOC,Discuss PT/OT consult with provider    Moisture Interventions: Absorbent underpads,Check for incontinence Q2 hours and as needed    Activity Interventions: Increase time out of bed    Mobility Interventions: HOB 30 degrees or less    Nutrition Interventions: Offer support with meals,snacks and hydration    Friction and Shear Interventions: HOB 30 degrees or less                Problem: Patient Education: Go to Patient Education Activity  Goal: Patient/Family Education  Outcome: Resolved/Met     Problem: Patient Education: Go to Patient Education Activity  Goal: Patient/Family Education  Outcome: Resolved/Met     Problem: Patient Education: Go to Patient Education Activity  Goal: Patient/Family Education  Outcome: Resolved/Met     Problem: Patient Education: Go to Patient Education Activity  Goal: Patient/Family Education  Outcome: Resolved/Met

## 2022-02-16 NOTE — PROGRESS NOTES
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 Upon arrival to ER he was noted to ba actively seizing and was given lorazepam, phenobarbital, Keppra, Depatoke with cessation of seizure activity. On arrival he had left gaze deviation and was moving the left upper and lowe ext. but not the right upper or lower. Head CT and CTA were completed with no acute findings but with encephalomalacia of both frontal lkobes and significant small vessel disease. UA and PCXR were NEG for infectious concerns and WBC 10. Utox was NEG.   He was transferred to [facility] in stable condition for cEEG monitoring with consult to Dr. Yashira Graves by sending facility.   Patient stable overnight and transferred out of ICU on     Interval history / Subjective:     Patient conscious and alert, answer questions, he said he feels better,        Assessment & Plan:     Status epilepticus with seizure disorder not on home medication  -recieved valproate 1622 mg IV and  Keppra 3500 mg IV and   phenobarbital 811.2 mg IV in ER  -improved and now on keppra 1500 mg iv po q 12 D5 ,  Discontinue normal saline, prn ativan  -EEG on  no active seizure seen   -CT head no acute abnormality  -CTA head previous right carotid stent with 55% diameter stenosis by NASCET criteria  within stent due to neointimal hyperplasia. 62% diameter stenosis origin left ICA by NASCET criteria.  Mild stenosis right intradural vertebral artery, no other significant  vertebral or basilar stenosis. No evidence of intracranial large vessel occlusion or significant stenosis. -MRI brain no acute finding  -conscious and alert, answer questions   -continue seizure precaution   -check bedside swallowing, to switch his meds to po   -transferred out of ICU on 2/12  -Neurology on board    Acute metabolic encephalopathy   -patient conscious and alert, answer questions  -continue neuro check and supportive care     Hx of CVA, no residual neurologic deficit   -resume home xarelto      Hx of DVT/PE  -on therapeutic lovenox, switched to home xarelto    Dysphagia   -speech consulted, recommended pureed diet/mildly thick liquid  -aspiration precaution         Code status: Full Code   Prophylaxis: Lovenox    Care Plan discussed with: Patient and Nurse  Anticipated Disposition: SNF     Hospital Problems  Never Reviewed          Codes Class Noted POA    Status epilepticus ([facility] ICD-10-CM: G40.901  ICD-9-CM: 345.3  2/11/2022 Unknown                 Vital Signs:    Last 24hrs VS reviewed since prior progress note. Most recent are:  Visit Vitals  /67   Pulse 85   Temp 97.6 °F (36.4 °C)   Resp 15   Wt 79.9 kg (176 lb 2.4 oz)   SpO2 (!) 85%   BMI 23.24 kg/m²       No intake or output data in the 24 hours ending 02/16/22 1056     Physical Examination:     I had a face to face encounter with this patient and independently examined them on 2/16/2022 as outlined below:          Constitutional:  No acute distress, cooperative, pleasant    ENT:  Oral mucosa moist, oropharynx benign. Resp:  CTA bilaterally. No wheezing/rhonchi/rales. No accessory muscle use. CV:  Regular rhythm, normal rate, no murmurs, gallops, rubs    GI:  Soft, non distended, non tender.  normoactive bowel sounds, no hepatosplenomegaly     Musculoskeletal:  No edema     Neurologic:  Conscious and alert, oriented to person and time, moves all extremities, CN II-XII reviewed            Data Review:    Review and/or order of clinical lab test  Review and/or order of tests in the radiology section of CPT  Review and/or order of tests in the medicine section of CPT      Labs:     Recent Labs     02/16/22  0520 02/15/22  0436   WBC 6.1 6.0   HGB 14.3 15.0   HCT 42.5 43.9   * 130*     Recent Labs     02/16/22  0520 02/15/22  0436 02/14/22  0113    139 137   K 3.8 3.8 3.8    105 109*   CO2 26 29 25   BUN 11 8 9   CREA 0.75 0.82 0.77   GLU 95 131* 188*   CA 9.3 9.1 8.4*   PHOS 3.2 2.5* 1.7*     No results for input(s): ALT, AP, TBIL, TBILI, TP, ALB, GLOB, GGT, AML, LPSE in the last 72 hours. No lab exists for component: SGOT, GPT, AMYP, HLPSE  No results for input(s): INR, PTP, APTT, INREXT, INREXT in the last 72 hours. No results for input(s): FE, TIBC, PSAT, FERR in the last 72 hours. No results found for: FOL, RBCF   No results for input(s): PH, PCO2, PO2 in the last 72 hours. No results for input(s): CPK, CKNDX, TROIQ in the last 72 hours.     No lab exists for component: CPKMB  Lab Results   Component Value Date/Time    Cholesterol, total 160 12/05/2010 03:58 AM    HDL Cholesterol 42 12/05/2010 03:58 AM    LDL, calculated 98.2 12/05/2010 03:58 AM    Triglyceride 99 12/05/2010 03:58 AM    CHOL/HDL Ratio 3.8 12/05/2010 03:58 AM     Lab Results   Component Value Date/Time    Glucose (POC) 148 (H) 02/13/2022 05:49 AM    Glucose (POC) 80 02/12/2022 06:25 PM    Glucose (POC) 67 02/12/2022 05:49 PM    Glucose (POC) 117 02/12/2022 12:12 PM    Glucose (POC) 105 02/12/2022 11:49 AM     Lab Results   Component Value Date/Time    Color YELLOW 02/10/2022 07:00 PM    Appearance CLEAR 02/10/2022 07:00 PM    Specific gravity 1.026 02/10/2022 07:00 PM    pH (UA) 5.5 02/10/2022 07:00 PM    Protein TRACE (A) 02/10/2022 07:00 PM    Glucose Negative 02/10/2022 07:00 PM    Ketone 15 (A) 02/10/2022 07:00 PM    Bilirubin Negative 02/10/2022 07:00 PM    Urobilinogen 1.0 02/10/2022 07:00 PM    Nitrites Negative 02/10/2022 07:00 PM    Leukocyte Esterase Negative 02/10/2022 07:00 PM    Epithelial cells 0 to 2 02/10/2022 07:00 PM    Bacteria Negative 02/10/2022 07:00 PM    WBC 0 to 3 02/10/2022 07:00 PM    RBC 0 to 3 02/10/2022 07:00 PM         Medications Reviewed:     Current Facility-Administered Medications   Medication Dose Route Frequency    levETIRAcetam (KEPPRA) oral solution 1,500 mg  1,500 mg Oral Q12H    dextrose 10 % infusion 125-250 mL  125-250 mL IntraVENous PRN    sodium chloride (NS) flush 5-40 mL  5-40 mL IntraVENous Q8H    sodium chloride (NS) flush 5-40 mL  5-40 mL IntraVENous PRN    acetaminophen (TYLENOL) tablet 650 mg  650 mg Oral Q6H PRN    LORazepam (ATIVAN) injection 4 mg  4 mg IntraVENous Q15MIN PRN    enoxaparin (LOVENOX) injection 80 mg  1 mg/kg SubCUTAneous Q12H    hydrocortisone (CORTAID) 1 % cream   Topical Q12H    latanoprost (XALATAN) 0.005 % ophthalmic solution 1 Drop  1 Drop Both Eyes QHS     ______________________________________________________________________  EXPECTED LENGTH OF STAY: 2d 14h  ACTUAL LENGTH OF STAY:          4                 Nolanle Najjar, MD

## 2022-02-24 NOTE — PROGRESS NOTES
Physician Progress Note      PATIENT:               Joselin Wells  CSN #:                  [id number]  :                       1960  ADMIT DATE:       2022 12:40 AM  DISCH DATE:        2022 6:21 PM  RESPONDING  PROVIDER #:        Mali Benjamin MD          QUERY TEXT:    Kathy query:  Patient was admitted with seizure and has a history of stroke. Imaging showed bilateral encephalomalacia. ? Patient was treated  with?valproate 1622 mg IV and Keppra 3500 mg IV and phenobarbital 811.2 mg IV in ER and Ativan. ? 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 Upon arrival to ER he was noted to ba actively seizing and was given lorazepam, phenobarbital, Keppra, Depatoke with cessation of seizure activity. Clinical Indicators: AMS, left gaze deviation, Minimally responsive,per EEG on  no active seizure seen, per CTA head previous right carotid stent with 55% diameter stenosis, per MRI brain no acute finding  Treatment: valproate 1622 mg IV and Keppra 3500 mg IV and phenobarbital 811.2 mg IV in ER and Ativan, Neurology consulted, EEG, CTA, MRI, seizure precaution, aspiration precaution, speech consulted, recommended pureed diet/mildly thick liquid  Options provided:  -- Seizure due to or sequelae of prior stroke present as evidenced by, Please document evidence. -- Seizure unrelated to prior stroke  -- Other - I will add my own diagnosis  -- Disagree - Not applicable / Not valid  -- Disagree - Clinically unable to determine / Unknown  -- Refer to Clinical Documentation Reviewer    PROVIDER RESPONSE TEXT:    Provider is clinically unable to determine a response to this query. Query created by:  Nichole Mccracken on 2/22/2022 8:20 AM      Electronically signed by:  Costa Good MD 2/23/2022 10:28 PM

## 2022-03-18 PROBLEM — G40.901 STATUS EPILEPTICUS (HCC): Status: ACTIVE | Noted: 2022-02-11

## 2024-08-20 ENCOUNTER — HOSPITAL ENCOUNTER (EMERGENCY)
Facility: HOSPITAL | Age: 64
Discharge: HOME | End: 2024-08-20
Payer: MEDICARE

## 2024-08-20 VITALS
BODY MASS INDEX: 21.87 KG/M2 | OXYGEN SATURATION: 100 % | SYSTOLIC BLOOD PRESSURE: 112 MMHG | DIASTOLIC BLOOD PRESSURE: 76 MMHG | TEMPERATURE: 98.5 F | HEIGHT: 73 IN | WEIGHT: 165 LBS | RESPIRATION RATE: 18 BRPM | HEART RATE: 78 BPM

## 2024-08-20 DIAGNOSIS — S61.215A LACERATION OF LEFT RING FINGER WITHOUT FOREIGN BODY WITHOUT DAMAGE TO NAIL, INITIAL ENCOUNTER: Primary | ICD-10-CM

## 2024-08-20 PROCEDURE — 6360000002 HC RX W HCPCS: Performed by: PHYSICIAN ASSISTANT

## 2024-08-20 PROCEDURE — 90715 TDAP VACCINE 7 YRS/> IM: CPT | Performed by: PHYSICIAN ASSISTANT

## 2024-08-20 PROCEDURE — 99284 EMERGENCY DEPT VISIT MOD MDM: CPT

## 2024-08-20 PROCEDURE — 90471 IMMUNIZATION ADMIN: CPT | Performed by: PHYSICIAN ASSISTANT

## 2024-08-20 RX ADMIN — TETANUS TOXOID, REDUCED DIPHTHERIA TOXOID AND ACELLULAR PERTUSSIS VACCINE, ADSORBED 0.5 ML: 5; 2.5; 8; 8; 2.5 SUSPENSION INTRAMUSCULAR at 12:20

## 2024-08-20 ASSESSMENT — PAIN - FUNCTIONAL ASSESSMENT: PAIN_FUNCTIONAL_ASSESSMENT: NONE - DENIES PAIN

## 2024-08-20 ASSESSMENT — LIFESTYLE VARIABLES
HOW MANY STANDARD DRINKS CONTAINING ALCOHOL DO YOU HAVE ON A TYPICAL DAY: PATIENT DOES NOT DRINK
HOW OFTEN DO YOU HAVE A DRINK CONTAINING ALCOHOL: NEVER

## 2024-08-20 NOTE — ED PROVIDER NOTES
EMERGENCY DEPARTMENT HISTORY & PHYSICAL EXAM    8/20/24, 12:09 PM EDT    Clinical Impression:  1. Laceration of left ring finger without foreign body without damage to nail, initial encounter        Assessment/Differential Diagnosis:     Ddx laceration, deep tissue injury, laceration injury, foreign body, tetanus status all considered    ED Course:   Initial assessment performed. The patients presenting problems have been discussed, and they are in agreement with the care plan formulated and outlined with them.  I have encouraged them to ask questions as they arise throughout their visit.    Pt comes to ED with laceration to left ring finger.  Patient was using a drill to drill through some form comes to him for squirrels.  He states his finger slipped causing laceration/abrasion to his left ring finger.  There was some bleeding initially.  They were able to wash it but with the continued bleeding he was brought to the ED for evaluation.  Unsure of his last tetanus.  He is right-hand dominant.  No other concern for injury.    Exam with middle-aged gentleman sitting in exam chair.  He appears comfortable in no acute distress.  Vitals are all within normal limits.  Examination of his left hand, ventral surface, proximal ring finger does reveal a lateral laceration that is well-approximated.  Does not gape open.  No active bleeding.  Tendons intact and strong with testing.  No foreign body noted.    After wound was washed by RN, Dermabond was placed x 1, with good wound edge approximation.  Patient tolerated well.  No complication.  Wound measures about 1.7 cm in total length.  Dry bandage was applied  Tetanus was updated    Wound care and return precautions were given    Medical Chart Review:  I have reviewed triage nursing documentation.      Disposition:  Home  in good condition.      Chief Complaint   Patient presents with    Laceration     HPI:    The history is provided by

## 2024-08-20 NOTE — DISCHARGE INSTRUCTIONS
Keep area clean, dry and covered for the next 2 days, can use as usual after that, washing daily.  Do not pick off the skin glue, it will come off over the course of a week or so  Tetanus was given today, let your doctor know further records  Return to ER if any new or worsening symptoms or new concerns

## 2025-05-13 ENCOUNTER — APPOINTMENT (OUTPATIENT)
Facility: HOSPITAL | Age: 65
End: 2025-05-13
Payer: MEDICARE

## 2025-05-13 ENCOUNTER — HOSPITAL ENCOUNTER (INPATIENT)
Facility: HOSPITAL | Age: 65
LOS: 4 days | Discharge: NURSING, REHAB OR LONG-TERM CARE | End: 2025-05-17
Attending: STUDENT IN AN ORGANIZED HEALTH CARE EDUCATION/TRAINING PROGRAM | Admitting: INTERNAL MEDICINE
Payer: MEDICARE

## 2025-05-13 DIAGNOSIS — S72.144A CLOSED NONDISPLACED INTERTROCHANTERIC FRACTURE OF RIGHT FEMUR, INITIAL ENCOUNTER (HCC): ICD-10-CM

## 2025-05-13 DIAGNOSIS — S72.114A CLOSED NONDISPLACED FRACTURE OF GREATER TROCHANTER OF RIGHT FEMUR, INITIAL ENCOUNTER (HCC): Primary | ICD-10-CM

## 2025-05-13 PROCEDURE — 6370000000 HC RX 637 (ALT 250 FOR IP): Performed by: STUDENT IN AN ORGANIZED HEALTH CARE EDUCATION/TRAINING PROGRAM

## 2025-05-13 PROCEDURE — 99285 EMERGENCY DEPT VISIT HI MDM: CPT

## 2025-05-13 PROCEDURE — 73700 CT LOWER EXTREMITY W/O DYE: CPT

## 2025-05-13 PROCEDURE — 73552 X-RAY EXAM OF FEMUR 2/>: CPT

## 2025-05-13 PROCEDURE — 1100000000 HC RM PRIVATE

## 2025-05-13 RX ORDER — ACETAMINOPHEN 325 MG/1
650 TABLET ORAL
Status: COMPLETED | OUTPATIENT
Start: 2025-05-13 | End: 2025-05-13

## 2025-05-13 RX ORDER — IBUPROFEN 600 MG/1
600 TABLET, FILM COATED ORAL
Status: COMPLETED | OUTPATIENT
Start: 2025-05-13 | End: 2025-05-13

## 2025-05-13 RX ADMIN — ACETAMINOPHEN 650 MG: 325 TABLET ORAL at 19:53

## 2025-05-13 RX ADMIN — IBUPROFEN 600 MG: 600 TABLET ORAL at 19:54

## 2025-05-13 ASSESSMENT — PAIN DESCRIPTION - LOCATION
LOCATION: HIP;LEG
LOCATION: LEG

## 2025-05-13 ASSESSMENT — PAIN SCALES - GENERAL: PAINLEVEL_OUTOF10: 10

## 2025-05-13 ASSESSMENT — PAIN SCALES - WONG BAKER: WONGBAKER_NUMERICALRESPONSE: HURTS LITTLE MORE

## 2025-05-13 ASSESSMENT — PAIN DESCRIPTION - ORIENTATION
ORIENTATION: RIGHT
ORIENTATION: RIGHT

## 2025-05-13 NOTE — ED TRIAGE NOTES
Patient arrives via EMS for a fall. Patient fell down three steps today (mechanical). Patient is complaining of right thigh pain since the fall.     Patient has some cognitive deficits due to prior stroke.     Active Ambulatory Problems     Diagnosis Date Noted    Status epilepticus (HCC) 02/11/2022     Resolved Ambulatory Problems     Diagnosis Date Noted    No Resolved Ambulatory Problems     Past Medical History:   Diagnosis Date    H/O blood clots     Seizures ([facility])     Stroke ([facility])

## 2025-05-14 ENCOUNTER — APPOINTMENT (OUTPATIENT)
Facility: HOSPITAL | Age: 65
End: 2025-05-14
Payer: MEDICARE

## 2025-05-14 ENCOUNTER — APPOINTMENT (OUTPATIENT)
Facility: HOSPITAL | Age: 65
End: 2025-05-14
Attending: HOSPITALIST
Payer: MEDICARE

## 2025-05-14 PROBLEM — Z86.718 HISTORY OF DVT (DEEP VEIN THROMBOSIS): Status: ACTIVE | Noted: 2025-05-14

## 2025-05-14 PROBLEM — G40.909 SEIZURE DISORDER (HCC): Status: ACTIVE | Noted: 2025-05-14

## 2025-05-14 PROBLEM — Z79.01 ANTICOAGULATED: Status: ACTIVE | Noted: 2025-05-14

## 2025-05-14 PROBLEM — S72.141P CLOSED DISP INTERTROCHANTERIC FRACTURE OF RIGHT FEMUR WITH MALUNION: Status: ACTIVE | Noted: 2025-05-14

## 2025-05-14 LAB
ANION GAP SERPL CALC-SCNC: 11 MMOL/L (ref 3–18)
BUN SERPL-MCNC: 21 MG/DL (ref 6–23)
BUN/CREAT SERPL: 18 (ref 12–20)
CALCIUM SERPL-MCNC: 9.4 MG/DL (ref 8.5–10.1)
CHLORIDE SERPL-SCNC: 101 MMOL/L (ref 98–107)
CO2 SERPL-SCNC: 26 MMOL/L (ref 21–32)
CREAT SERPL-MCNC: 1.16 MG/DL (ref 0.6–1.3)
ECHO BSA: 1.96 M2
ERYTHROCYTE [DISTWIDTH] IN BLOOD BY AUTOMATED COUNT: 13.2 % (ref 11.6–14.5)
GLUCOSE SERPL-MCNC: 91 MG/DL (ref 74–108)
HCT VFR BLD AUTO: 41.4 % (ref 36–48)
HGB BLD-MCNC: 14.1 G/DL (ref 13–16)
MCH RBC QN AUTO: 32.9 PG (ref 24–34)
MCHC RBC AUTO-ENTMCNC: 34.1 G/DL (ref 31–37)
MCV RBC AUTO: 96.7 FL (ref 78–100)
NRBC # BLD: 0 K/UL (ref 0–0.01)
NRBC BLD-RTO: 0 PER 100 WBC
PLATELET # BLD AUTO: 128 K/UL (ref 135–420)
PMV BLD AUTO: 10.1 FL (ref 9.2–11.8)
POTASSIUM SERPL-SCNC: 3.9 MMOL/L (ref 3.5–5.5)
RBC # BLD AUTO: 4.28 M/UL (ref 4.35–5.65)
SODIUM SERPL-SCNC: 138 MMOL/L (ref 136–145)
TROPONIN T SERPL HS-MCNC: <6 NG/L (ref 0–22)
WBC # BLD AUTO: 8.6 K/UL (ref 4.6–13.2)

## 2025-05-14 PROCEDURE — 2580000003 HC RX 258: Performed by: HOSPITALIST

## 2025-05-14 PROCEDURE — 84484 ASSAY OF TROPONIN QUANT: CPT

## 2025-05-14 PROCEDURE — 80177 DRUG SCRN QUAN LEVETIRACETAM: CPT

## 2025-05-14 PROCEDURE — 93005 ELECTROCARDIOGRAM TRACING: CPT | Performed by: HOSPITALIST

## 2025-05-14 PROCEDURE — 36415 COLL VENOUS BLD VENIPUNCTURE: CPT

## 2025-05-14 PROCEDURE — 6360000002 HC RX W HCPCS: Performed by: INTERNAL MEDICINE

## 2025-05-14 PROCEDURE — 2500000003 HC RX 250 WO HCPCS: Performed by: INTERNAL MEDICINE

## 2025-05-14 PROCEDURE — 6360000004 HC RX CONTRAST MEDICATION: Performed by: HOSPITALIST

## 2025-05-14 PROCEDURE — 6370000000 HC RX 637 (ALT 250 FOR IP): Performed by: INTERNAL MEDICINE

## 2025-05-14 PROCEDURE — 85027 COMPLETE CBC AUTOMATED: CPT

## 2025-05-14 PROCEDURE — 1100000000 HC RM PRIVATE

## 2025-05-14 PROCEDURE — 71275 CT ANGIOGRAPHY CHEST: CPT

## 2025-05-14 PROCEDURE — 80048 BASIC METABOLIC PNL TOTAL CA: CPT

## 2025-05-14 PROCEDURE — 93970 EXTREMITY STUDY: CPT

## 2025-05-14 RX ORDER — MORPHINE SULFATE 2 MG/ML
2 INJECTION, SOLUTION INTRAMUSCULAR; INTRAVENOUS
Refills: 0 | Status: DISCONTINUED | OUTPATIENT
Start: 2025-05-14 | End: 2025-05-15

## 2025-05-14 RX ORDER — POTASSIUM CHLORIDE 7.45 MG/ML
10 INJECTION INTRAVENOUS PRN
Status: DISCONTINUED | OUTPATIENT
Start: 2025-05-14 | End: 2025-05-17 | Stop reason: HOSPADM

## 2025-05-14 RX ORDER — SODIUM CHLORIDE 9 MG/ML
INJECTION, SOLUTION INTRAVENOUS CONTINUOUS
Status: DISPENSED | OUTPATIENT
Start: 2025-05-14 | End: 2025-05-15

## 2025-05-14 RX ORDER — POTASSIUM CHLORIDE 1500 MG/1
40 TABLET, EXTENDED RELEASE ORAL PRN
Status: DISCONTINUED | OUTPATIENT
Start: 2025-05-14 | End: 2025-05-17 | Stop reason: HOSPADM

## 2025-05-14 RX ORDER — ACETAMINOPHEN 325 MG/1
650 TABLET ORAL EVERY 6 HOURS PRN
Status: DISCONTINUED | OUTPATIENT
Start: 2025-05-14 | End: 2025-05-17 | Stop reason: HOSPADM

## 2025-05-14 RX ORDER — SODIUM CHLORIDE 0.9 % (FLUSH) 0.9 %
5-40 SYRINGE (ML) INJECTION EVERY 12 HOURS SCHEDULED
Status: DISCONTINUED | OUTPATIENT
Start: 2025-05-14 | End: 2025-05-17 | Stop reason: HOSPADM

## 2025-05-14 RX ORDER — SODIUM CHLORIDE 9 MG/ML
INJECTION, SOLUTION INTRAVENOUS PRN
Status: DISCONTINUED | OUTPATIENT
Start: 2025-05-14 | End: 2025-05-17 | Stop reason: HOSPADM

## 2025-05-14 RX ORDER — MORPHINE SULFATE 4 MG/ML
4 INJECTION, SOLUTION INTRAMUSCULAR; INTRAVENOUS
Refills: 0 | Status: DISCONTINUED | OUTPATIENT
Start: 2025-05-14 | End: 2025-05-15

## 2025-05-14 RX ORDER — LEVETIRACETAM 500 MG/1
500 TABLET ORAL 2 TIMES DAILY
Status: DISCONTINUED | OUTPATIENT
Start: 2025-05-14 | End: 2025-05-17

## 2025-05-14 RX ORDER — POLYETHYLENE GLYCOL 3350 17 G/17G
17 POWDER, FOR SOLUTION ORAL DAILY PRN
Status: DISCONTINUED | OUTPATIENT
Start: 2025-05-14 | End: 2025-05-17 | Stop reason: HOSPADM

## 2025-05-14 RX ORDER — LATANOPROST 50 UG/ML
1 SOLUTION/ DROPS OPHTHALMIC NIGHTLY
COMMUNITY

## 2025-05-14 RX ORDER — ACETAMINOPHEN 650 MG/1
650 SUPPOSITORY RECTAL EVERY 6 HOURS PRN
Status: DISCONTINUED | OUTPATIENT
Start: 2025-05-14 | End: 2025-05-17 | Stop reason: HOSPADM

## 2025-05-14 RX ORDER — ONDANSETRON 4 MG/1
4 TABLET, ORALLY DISINTEGRATING ORAL EVERY 8 HOURS PRN
Status: DISCONTINUED | OUTPATIENT
Start: 2025-05-14 | End: 2025-05-17 | Stop reason: HOSPADM

## 2025-05-14 RX ORDER — ONDANSETRON 2 MG/ML
4 INJECTION INTRAMUSCULAR; INTRAVENOUS EVERY 6 HOURS PRN
Status: DISCONTINUED | OUTPATIENT
Start: 2025-05-14 | End: 2025-05-17 | Stop reason: HOSPADM

## 2025-05-14 RX ORDER — MAGNESIUM SULFATE IN WATER 40 MG/ML
2000 INJECTION, SOLUTION INTRAVENOUS PRN
Status: DISCONTINUED | OUTPATIENT
Start: 2025-05-14 | End: 2025-05-17 | Stop reason: HOSPADM

## 2025-05-14 RX ORDER — ENOXAPARIN SODIUM 100 MG/ML
40 INJECTION SUBCUTANEOUS DAILY
Status: DISCONTINUED | OUTPATIENT
Start: 2025-05-14 | End: 2025-05-14

## 2025-05-14 RX ORDER — TAMSULOSIN HYDROCHLORIDE 0.4 MG/1
0.4 CAPSULE ORAL DAILY
Status: DISCONTINUED | OUTPATIENT
Start: 2025-05-14 | End: 2025-05-17 | Stop reason: HOSPADM

## 2025-05-14 RX ORDER — IOPAMIDOL 755 MG/ML
100 INJECTION, SOLUTION INTRAVASCULAR
Status: COMPLETED | OUTPATIENT
Start: 2025-05-14 | End: 2025-05-14

## 2025-05-14 RX ORDER — SODIUM CHLORIDE 0.9 % (FLUSH) 0.9 %
5-40 SYRINGE (ML) INJECTION PRN
Status: DISCONTINUED | OUTPATIENT
Start: 2025-05-14 | End: 2025-05-17 | Stop reason: HOSPADM

## 2025-05-14 RX ADMIN — SODIUM CHLORIDE, PRESERVATIVE FREE 10 ML: 5 INJECTION INTRAVENOUS at 20:13

## 2025-05-14 RX ADMIN — MORPHINE SULFATE 2 MG: 2 INJECTION, SOLUTION INTRAMUSCULAR; INTRAVENOUS at 20:10

## 2025-05-14 RX ADMIN — LEVETIRACETAM 500 MG: 500 TABLET, FILM COATED ORAL at 01:10

## 2025-05-14 RX ADMIN — TAMSULOSIN HYDROCHLORIDE 0.4 MG: 0.4 CAPSULE ORAL at 10:01

## 2025-05-14 RX ADMIN — IOPAMIDOL 70 ML: 755 INJECTION, SOLUTION INTRAVENOUS at 11:42

## 2025-05-14 RX ADMIN — LEVETIRACETAM 500 MG: 500 TABLET, FILM COATED ORAL at 10:01

## 2025-05-14 RX ADMIN — SODIUM CHLORIDE: 0.9 INJECTION, SOLUTION INTRAVENOUS at 10:00

## 2025-05-14 RX ADMIN — LEVETIRACETAM 500 MG: 500 TABLET, FILM COATED ORAL at 20:09

## 2025-05-14 RX ADMIN — ONDANSETRON 4 MG: 2 INJECTION, SOLUTION INTRAMUSCULAR; INTRAVENOUS at 14:51

## 2025-05-14 RX ADMIN — MORPHINE SULFATE 2 MG: 2 INJECTION, SOLUTION INTRAMUSCULAR; INTRAVENOUS at 14:51

## 2025-05-14 ASSESSMENT — PAIN DESCRIPTION - ONSET: ONSET: GRADUAL

## 2025-05-14 ASSESSMENT — PAIN SCALES - GENERAL
PAINLEVEL_OUTOF10: 6
PAINLEVEL_OUTOF10: 0

## 2025-05-14 ASSESSMENT — PAIN DESCRIPTION - FREQUENCY: FREQUENCY: INTERMITTENT

## 2025-05-14 ASSESSMENT — PAIN DESCRIPTION - DESCRIPTORS: DESCRIPTORS: ACHING

## 2025-05-14 ASSESSMENT — PAIN DESCRIPTION - LOCATION: LOCATION: HIP

## 2025-05-14 ASSESSMENT — PAIN DESCRIPTION - PAIN TYPE: TYPE: ACUTE PAIN

## 2025-05-14 ASSESSMENT — PAIN DESCRIPTION - ORIENTATION: ORIENTATION: RIGHT

## 2025-05-14 NOTE — PROGRESS NOTES
Care  assisting Care Mgr Kassidy Friend RN with Discharge Planning needs for patient.  Referral sent to [facility] (Acute Rehab) and area Skilled Nursing Facilities (SNF's) for review and consideration for placement.    Will follow for further needs.

## 2025-05-14 NOTE — PLAN OF CARE
Problem: Chronic Conditions and Co-morbidities  Goal: Patient's chronic conditions and co-morbidity symptoms are monitored and maintained or improved  Outcome: Progressing  Flowsheets (Taken 5/14/2025 9180)  Care Plan - Patient's Chronic Conditions and Co-Morbidity Symptoms are Monitored and Maintained or Improved: Monitor and assess patient's chronic conditions and comorbid symptoms for stability, deterioration, or improvement

## 2025-05-14 NOTE — PROGRESS NOTES
4 Eyes Skin Assessment     NAME:  Jaxon Barraza  YOB: 1960  MEDICAL RECORD NUMBER:  [id number]    The patient is being assessed for  Admission    I agree that at least one RN has performed a thorough Head to Toe Skin Assessment on the patient. ALL assessment sites listed below have been assessed.      Areas assessed by both nurses:    Head, Face, Ears, Shoulders, Back, Chest, Arms, Elbows, Hands, Sacrum. Buttock, Coccyx, Ischium, Legs. Feet and Heels, and Under Medical Devices  the right distal heel has healed venous ulcers.         Does the Patient have a Wound? No noted wound(s)       Leonides Prevention initiated by RN: Yes  Wound Care Orders initiated by RN: No    Pressure Injury (Stage 3,4, Unstageable, DTI, NWPT, and Complex wounds) if present, place Wound referral order by RN under : No    New Ostomies, if present place, Ostomy referral order under : No     Nurse 1 eSignature: Electronically signed by Mary Reyes RN on 5/14/25 at 6:30 AM EDT    **SHARE this note so that the co-signing nurse can place an eSignature**    Nurse 2 eSignature: {Esignature:[id number]}

## 2025-05-14 NOTE — PROGRESS NOTES
Patient in bed awake, A/Ox 4 and has occasional confusion  speech clear, hearing intact, has suffered a right nondisplaced intertrochanteric femur fracture. incontinent of urine ([place] in place) and stool. On room air, clear to auscultation bilaterally normal percussion bilaterally 18, respirations unlabored. Skin is warm, dry, and intact but has a old venous ulcer on the right heel. Radial and pedal pulses present bilaterally, capillary refill <3 seconds to fingers and toes. Abdomen soft, bowel sounds active. moderate hand  noted to bilateral upper extremities. Side rails x3 up, bed locked and in lowest position, bed alarm on, call bell and bedside table within reach, needs attended,

## 2025-05-14 NOTE — ED NOTES
ED TO INPATIENT SBAR HANDOFF     Patient Name: Jaxon Barraza   Preferred Name: Jaxon  : 1960  64 y.o.             Family/Caregiver Present: yes   Code Status Order: No Order  PO Status:[unfilled]  Telemetry Order: No  C-SSRS: Risk of Suicide: No Risk  Sitter no   Restraints:    Sepsis Risk Score      Situation:  Chief Complaint   Patient presents with    Fall     Brief Description of Patient's Condition: Fracture right hip  Mental Status: oriented  Arrived from: Home  Abnormal labs: Abnormal Labs Reviewed - No data to display     Background:  Allergies: No Known Allergies  History:   Past Medical History:   Diagnosis Date    H/O blood clots     Seizures (HCC)     Stroke (HCC)         Assessment:  Vitals/MEWS:        Vitals:    25 1847 25   BP:  (!) 156/91   Pulse:  (!) 101   Resp:  18   Temp:  97.8 °F (36.6 °C)   TempSrc:  Oral   SpO2:  98%   Weight: 74.8 kg (165 lb)    Height: 1.854 m (6' 1\")      Cardiac Rhythm:       Deterioration Index:  O2 Flow Rate:    O2 Device:    Critical Lab Results: [unfilled]  Cultures:  None at this time  NIH Score: NIH     Active LDA's:   Active Central Lines:                            Active Wounds:    Active Sharma's:    Active Feeding Tubes:        Administered Medications:   Medications   acetaminophen (TYLENOL) tablet 650 mg (650 mg Oral Given 25)   ibuprofen (ADVIL;MOTRIN) tablet 600 mg (600 mg Oral Given 25)     Last documented pain medication administration: 19:54  Pertinent or High Risk Medications/Drips: no   o If Yes, please provide details:   Blood Product Administration: no  o If Yes, please provide details:     Process Protocols/Bundles:     Patient Belongings:  Patient, underwear, and shoes in a bag    Additional Comments: Patient's brother at bedside and helpful with patient.    If any further questions, please call Sending RN at [phone]  Opportunity for questions and clarification were provided.     Electronically signed by:

## 2025-05-14 NOTE — CARE COORDINATION
05/14/25 0807   Service Assessment   Patient Orientation Alert and Oriented;Person;Place;Situation;Self   Cognition Other (see comment)  (H/O stroke and impaired cognition per chart review)   History Provided By Patient   Primary Caregiver Self   Accompanied By/Relationship No family members present during interview   Support Systems Family Members   Patient's Healthcare Decision Maker is: Legal Next of Kin   PCP Verified by CM Yes   Last Visit to PCP Within last 3 months   Prior Functional Level Assistance with the following:;Cooking;Housework;Shopping   Current Functional Level Assistance with the following:;Cooking;Housework;Shopping   Can patient return to prior living arrangement Yes   Ability to make needs known: Good   Family able to assist with home care needs: Yes   Would you like for me to discuss the discharge plan with any other family members/significant others, and if so, who? Yes  (Siblings)   Financial Resources Medicare   Community Resources None   Social/Functional History   Lives With Family   Type of Home House   Home Layout Two level   Home Access Stairs to enter with rails   Entrance Stairs - Rails Right   Bathroom Shower/Tub Walk-in shower   Bathroom Toilet Standard   Bathroom Equipment None   Bathroom Accessibility Accessible   Home Equipment Cane;Walker - Rolling   Receives Help From Family   Prior Level of Assist for ADLs Independent   Prior Level of Assist for Homemaking Needs assistance   Homemaking Responsibilities No   Ambulation Assistance Independent   Prior Level of Assist for Transfers Independent   Active  No   Patient's  Info Siblings   Mode of Transportation Family   Occupation Unemployed   Discharge Planning   Type of Residence House   Living Arrangements Family Members   Current Services Prior To Admission None   Potential Assistance Needed N/A   DME Ordered? No   Potential Assistance Purchasing Medications No   Type of Home Care Services None   Patient expects  to be discharged to: Rehabilitation facility   Follow Up Appointment: Best Day/Time  Monday AM   One/Two Story Residence Two story   History of falls? 1   Services At/After Discharge   Transition of Care Consult (CM Consult) SNF;Acute Rehab   Services At/After Discharge Skilled Nursing Facility (SNF);Inpatient rehab   [place] Resource Information Provided? No   Mode of Transport at Discharge Other (see comment)  (Family vs BLS)   Confirm Follow Up Transport Other (see comment)  (Family vs BLS)   Condition of Participation: Discharge Planning   The Plan for Transition of Care is related to the following treatment goals: Plan pending orthopedic recommendations, anticipate ARF/SNF   The Patient and/or Patient Representative was provided with a Choice of Provider? Patient   The Patient and/Or Patient Representative agree with the Discharge Plan? Yes   Freedom of Choice list was provided with basic dialogue that supports the patient's individualized plan of care/goals, treatment preferences, and shares the quality data associated with the providers?  Yes   This CM met with patient in room. No family members present at bedside. Patient is alerted and oriented to person, place, and time, but does repeat questions at times. Patient states that he lives with his brother Marko Barraza and his sister in a 2 story house. He states that there are steps to get in with a rail on the left side, and he states he fell going down the stairs and that is why he came here. Patient states that he has a walker and a cane in the home and that he sometimes uses the cane for ambulation assist. Patient states that he is able to clean and dress himself, but states his family provides cooking, housekeeping, and transportation. Patient unsure who his PCP is, according to chart review he follows at [facility] and [facility]. Patient is pending ortho consult. Potential for DC to ARF/SNF/HH based on patient's  no

## 2025-05-14 NOTE — PROGRESS NOTES
Hospitalist Progress Note    Patient: Jaxon Barraza MRN: [id number]  CSN: [id number]    YOB: 1960  Age: 64 y.o.  Sex: male    DOA: 5/13/2025 LOS:  LOS: 1 day         Total duration of encounter: 1 day      Chief Complaint ;  Very pleasant and cooperative 64-year-old gentleman who slipped on the stairs and has suffered a right nondisplaced intertrochanteric femur fracture. It appears nondisplaced. Orthopedics has been consulted. No recommendation at this time as to need for surgery. CT scan also shows a right iliopsoas hemorrhage.     Subjective ;  I feel fine and I am in pain. Can u talked with my brother ? He knows everything     Discussed with his brother- ED - reported patient's of Xarelto for 4 months    Review of systems:  Constitutional: denies fevers, chills, myalgias  Respiratory: denies SOB, cough  Cardiovascular: denies chest pain, palpitations  Gastrointestinal: denies nausea, vomiting, diarrhea  Msk : leg pain     10 systems reviewed, all negative other than what is mentioned above.      Vital signs/Intake and Output:  Visit Vitals  BP (!) 122/90   Pulse 84   Temp 98.1 °F (36.7 °C) (Oral)   Resp 16   Ht 1.854 m (6' 1\")   Wt 74.8 kg (165 lb)   SpO2 100%   BMI 21.77 kg/m²     Current Shift:  No intake/output data recorded.  Last three shifts:  05/12 1901 - 05/14 0700  In: -   Out: 200 [Urine:200]    Exam:    General: Well developed, alert, NAD, OX3  Head/Neck: NCAT, supple, No masses, No lymphadenopathy  CVS:Regular rate and rhythm, no M/R/G, S1/S2 heard, no thrill  Lungs:Clear to auscultation bilaterally, no wheezes, rhonchi, or rales  Abdomen: Soft, Nontender, No distention, Normal Bowel sounds, No hepatomegaly  Extremities: No C/C/E, pulses palpable 2+  Skin:normal texture and turgor, no rashes, no lesions  Neuro:grossly normal , follows commands. Let   Psych:appropriate    Labs: Results:       Chemistry Recent Labs     05/14/25  0514   GLUCOSE 91      K 3.9      CO2 26

## 2025-05-14 NOTE — PROGRESS NOTES
TRANSFER - IN REPORT:    Verbal report received from Stephanie SANTANA on Jaxon Barraza  being received from ED for routine progression of patient care      Report consisted of patient's Situation, Background, Assessment and   Recommendations(SBAR).     Information from the following report(s) Nurse Handoff Report, ED Encounter Summary, ED SBAR, Adult Overview, Surgery Report, MAR, and Recent Results was reviewed with the receiving nurse.    Opportunity for questions and clarification was provided.      Assessment completed upon patient's arrival to unit and care assumed.

## 2025-05-14 NOTE — PROGRESS NOTES
Called Som Barraza (Other)  [phone] (Home Phone) no one answered   Called pt room and talked with Ed, he stated pt off xarelto over 4 months, questions answered

## 2025-05-14 NOTE — CONSULTS
Consultation Note    Assessment:         Principal Problem:    Closed nondisplaced fracture of greater trochanter of right femur, initial encounter ([facility])  Active Problems:    Closed disp intertrochanteric fracture of right femur with malunion    Seizure disorder ([facility])    History of DVT (deep vein thrombosis)    Anticoagulated  Resolved Problems:    * No resolved hospital problems. *      Plan:     A review of the plain films and the CT scan  reveal a stable trochanteric fracture.. not a surgical lesion... associated iliopsoas tendon bleeding.. following    Subjective:     I was asked to evaluate POPPY ALVES for a nondisplaced right IT fracture.. difficulty ambulating... He  is a 64 y.o. male admitted on 5/13/2025 for     No Known Allergies    Current Facility-Administered Medications   Medication Dose Route Frequency Provider Last Rate Last Admin    sodium chloride flush 0.9 % injection 5-40 mL  5-40 mL IntraVENous 2 times per day Tai Estrada MD        sodium chloride flush 0.9 % injection 5-40 mL  5-40 mL IntraVENous PRTai Fields MD        0.9 % sodium chloride infusion   IntraVENous PRN Tai Estrada MD        potassium chloride (KLOR-CON M) extended release tablet 40 mEq  40 mEq Oral PRTai Fields MD        Or    potassium bicarb-citric acid (EFFER-K) effervescent tablet 40 mEq  40 mEq Oral PRTai Fields MD        Or    potassium chloride 10 mEq/100 mL IVPB (Peripheral Line)  10 mEq IntraVENous PRTai Fields MD        magnesium sulfate 2000 mg in 50 mL IVPB premix  2,000 mg IntraVENous PRTai Fields MD        ondansetron (ZOFRAN-ODT) disintegrating tablet 4 mg  4 mg Oral Q8H PRTai Fields MD        Or    ondansetron (ZOFRAN) injection 4 mg  4 mg IntraVENous Q6H PRTai Fields MD        polyethylene glycol (GLYCOLAX) packet 17 g  17 g Oral Daily PRTai Fields MD        acetaminophen (TYLENOL) tablet 650 mg  650 mg Oral Q6H

## 2025-05-14 NOTE — PROGRESS NOTES
met with the patient's older brother who was in the  room waiting   for the patient to come back from a procedure. He was anxious about what the   outcome would be. He is the caregiver to his brother. He told me patient is in the   hospital because he had fallen in their house.     The brother thanked  for the visit.      provided presence, support, prayer, and assurance of continued prayer.      Chaplains will provide follow-up care for patient and family as needed.           Spiritual Health History and Assessment/Progress Note  [facility]    Spiritual/Emotional Needs, Family Care, Emotional distress, Life Adjustments, Adjustment to illness,      Name: Jaxon Barraza MRN: [id number]    Age: 64 y.o.     Sex: male   Language: English   Zoroastrianism: None   Closed nondisplaced fracture of greater trochanter of right femur, initial encounter ([facility])     Date: 5/14/2025            Total Time Calculated: 10 min              Spiritual Assessment began in [address] SURGICAL/ONCOLOGY        Referral/Consult From: Rounding   Encounter Overview/Reason: Spiritual/Emotional Needs  Service Provided For: Family    Angelica, Belief, Meaning:   Patient unable to assess at this time  Family/Friends identify as spiritual      Importance and Influence:  Patient unable to assess at this time  Family/Friends have spiritual/personal beliefs that influence decisions regarding the patient's health    Community:  Patient Other: Not present  Family/Friends are connected with a spiritual community: and feel well-supported. Support system includes: Friends    Assessment and Plan of Care:     Patient Interventions include: Other: None  Family/Friends Interventions include: Facilitated expression of thoughts and feelings    Patient Plan of Care: No spiritual needs identified for follow-up  Family/Friends Plan of Care: No spiritual needs identified for follow-up    Electronically signed by Lynnette Matthews

## 2025-05-14 NOTE — H&P
History & Physical      Name: Jaxon Barraza  : 1960  MRN: [id number]  Date: 2025    Assessment and Plan:    Very pleasant and cooperative 64-year-old gentleman who slipped on the stairs and has suffered a right nondisplaced intertrochanteric femur fracture.  It appears nondisplaced.  Orthopedics has been consulted.  No recommendation at this time as to need for surgery.  CT scan also shows a right iliopsoas hemorrhage.  Given this finding we will hold off on anticoagulation.  Hemoglobin will be trended.  Presently hemodynamically stable.    # Right nondisplaced intertrochanteric femur fracture.  - Admission to the hospital.  - Orthopedics consultation.  Await recommendations.  - Pain management.    # Right iliopsoas hemorrhage.  - Hold anticoagulation for the moment.  Reassess in the morning.  - Trend hemoglobin.    # Anticoagulated.  -Noted.    # Seizure disorder.  -Continue Keppra 500 mg p.o. twice daily    # History of DVT and pulmonary embolism.  -History noted.    # History of stroke.  -History noted.    # DVT prevention.  -Hold anticoagulation at this time.  Reassess in the morning.  Question of right iliopsoas hemorrhage.    # CODE STATUS.  - Full code.        CC: Right thigh pain.      HPI:     64-year-old gentleman with past medical history of stroke, DVT, pulmonary embolism and seizure disorder followed by [facility] here after a fall.  Was descending 3 steps.  Tripped on the last 1 and then fell onto right thigh.  Felt immediate pain.  Unable to bear weight.  Denies seizure.  Denies loss of consciousness.  Did not hit head.  Tells he takes Xarelto at home that he receives through the patient assistance program.   X-ray shows nondisplaced femoral fracture-intertrochanteric.  Fracture subtle per radiology.  CT scan also shows hemorrhage right iliopsoas muscle.  Dr. Hayward of orthopedics consulted.    Pmhx: As detailed above.    SocHx: No alcohol drugs or

## 2025-05-14 NOTE — PROGRESS NOTES
Bedside and Verbal shift change report given to Allegra RN (oncoming nurse) by Mary Reyes RN (offgoing nurse). Report included the following information Nurse Handoff Report, Index, Adult Overview, Intake/Output, MAR, Recent Results, and Med Rec Status.

## 2025-05-14 NOTE — PROGRESS NOTES
Case discussed with dr. Gil recommend medical treatment, no surgical needed at this point and  recommend partial wt bearing. Pt/ot ordered

## 2025-05-15 LAB
ANION GAP SERPL CALC-SCNC: 11 MMOL/L (ref 3–18)
BUN SERPL-MCNC: 12 MG/DL (ref 6–23)
BUN/CREAT SERPL: 12 (ref 12–20)
CALCIUM SERPL-MCNC: 9.1 MG/DL (ref 8.5–10.1)
CHLORIDE SERPL-SCNC: 106 MMOL/L (ref 98–107)
CO2 SERPL-SCNC: 23 MMOL/L (ref 21–32)
CREAT SERPL-MCNC: 1.02 MG/DL (ref 0.6–1.3)
EKG ATRIAL RATE: 91 BPM
EKG DIAGNOSIS: NORMAL
EKG P AXIS: 60 DEGREES
EKG P-R INTERVAL: 146 MS
EKG Q-T INTERVAL: 368 MS
EKG QRS DURATION: 74 MS
EKG QTC CALCULATION (BAZETT): 452 MS
EKG R AXIS: 0 DEGREES
EKG T AXIS: 54 DEGREES
EKG VENTRICULAR RATE: 91 BPM
ERYTHROCYTE [DISTWIDTH] IN BLOOD BY AUTOMATED COUNT: 13.3 % (ref 11.6–14.5)
GLUCOSE SERPL-MCNC: 95 MG/DL (ref 74–108)
HCT VFR BLD AUTO: 42.9 % (ref 36–48)
HGB BLD-MCNC: 14.3 G/DL (ref 13–16)
MCH RBC QN AUTO: 33 PG (ref 24–34)
MCHC RBC AUTO-ENTMCNC: 33.3 G/DL (ref 31–37)
MCV RBC AUTO: 99.1 FL (ref 78–100)
NRBC # BLD: 0 K/UL (ref 0–0.01)
NRBC BLD-RTO: 0 PER 100 WBC
PLATELET # BLD AUTO: 107 K/UL (ref 135–420)
PMV BLD AUTO: 10 FL (ref 9.2–11.8)
POTASSIUM SERPL-SCNC: 4.1 MMOL/L (ref 3.5–5.5)
RBC # BLD AUTO: 4.33 M/UL (ref 4.35–5.65)
SODIUM SERPL-SCNC: 140 MMOL/L (ref 136–145)
WBC # BLD AUTO: 7.9 K/UL (ref 4.6–13.2)

## 2025-05-15 PROCEDURE — 6360000002 HC RX W HCPCS: Performed by: INTERNAL MEDICINE

## 2025-05-15 PROCEDURE — 6360000002 HC RX W HCPCS: Performed by: HOSPITALIST

## 2025-05-15 PROCEDURE — 6370000000 HC RX 637 (ALT 250 FOR IP): Performed by: HOSPITALIST

## 2025-05-15 PROCEDURE — 36415 COLL VENOUS BLD VENIPUNCTURE: CPT

## 2025-05-15 PROCEDURE — 1100000000 HC RM PRIVATE

## 2025-05-15 PROCEDURE — 85027 COMPLETE CBC AUTOMATED: CPT

## 2025-05-15 PROCEDURE — 97530 THERAPEUTIC ACTIVITIES: CPT

## 2025-05-15 PROCEDURE — 97166 OT EVAL MOD COMPLEX 45 MIN: CPT

## 2025-05-15 PROCEDURE — 97162 PT EVAL MOD COMPLEX 30 MIN: CPT

## 2025-05-15 PROCEDURE — 97116 GAIT TRAINING THERAPY: CPT

## 2025-05-15 PROCEDURE — 2500000003 HC RX 250 WO HCPCS: Performed by: INTERNAL MEDICINE

## 2025-05-15 PROCEDURE — 93010 ELECTROCARDIOGRAM REPORT: CPT | Performed by: INTERNAL MEDICINE

## 2025-05-15 PROCEDURE — 2580000003 HC RX 258: Performed by: HOSPITALIST

## 2025-05-15 PROCEDURE — 80048 BASIC METABOLIC PNL TOTAL CA: CPT

## 2025-05-15 PROCEDURE — 6370000000 HC RX 637 (ALT 250 FOR IP): Performed by: INTERNAL MEDICINE

## 2025-05-15 RX ORDER — OXYCODONE AND ACETAMINOPHEN 5; 325 MG/1; MG/1
1 TABLET ORAL EVERY 4 HOURS PRN
Refills: 0 | Status: DISCONTINUED | OUTPATIENT
Start: 2025-05-15 | End: 2025-05-17 | Stop reason: HOSPADM

## 2025-05-15 RX ORDER — MORPHINE SULFATE 2 MG/ML
1 INJECTION, SOLUTION INTRAMUSCULAR; INTRAVENOUS EVERY 4 HOURS PRN
Status: DISCONTINUED | OUTPATIENT
Start: 2025-05-15 | End: 2025-05-17 | Stop reason: HOSPADM

## 2025-05-15 RX ORDER — ENOXAPARIN SODIUM 100 MG/ML
1 INJECTION SUBCUTANEOUS 2 TIMES DAILY
Status: DISCONTINUED | OUTPATIENT
Start: 2025-05-15 | End: 2025-05-17 | Stop reason: HOSPADM

## 2025-05-15 RX ORDER — ENOXAPARIN SODIUM 100 MG/ML
40 INJECTION SUBCUTANEOUS DAILY
Status: DISCONTINUED | OUTPATIENT
Start: 2025-05-15 | End: 2025-05-15

## 2025-05-15 RX ORDER — POLYETHYLENE GLYCOL 3350 17 G/17G
17 POWDER, FOR SOLUTION ORAL DAILY
Status: DISCONTINUED | OUTPATIENT
Start: 2025-05-15 | End: 2025-05-17 | Stop reason: HOSPADM

## 2025-05-15 RX ORDER — DOCUSATE SODIUM 100 MG/1
100 CAPSULE, LIQUID FILLED ORAL 2 TIMES DAILY
Status: DISCONTINUED | OUTPATIENT
Start: 2025-05-15 | End: 2025-05-17 | Stop reason: HOSPADM

## 2025-05-15 RX ADMIN — POLYETHYLENE GLYCOL 3350 17 G: 17 POWDER, FOR SOLUTION ORAL at 10:02

## 2025-05-15 RX ADMIN — MORPHINE SULFATE 4 MG: 4 INJECTION, SOLUTION INTRAMUSCULAR; INTRAVENOUS at 04:47

## 2025-05-15 RX ADMIN — MORPHINE SULFATE 2 MG: 2 INJECTION, SOLUTION INTRAMUSCULAR; INTRAVENOUS at 08:35

## 2025-05-15 RX ADMIN — TAMSULOSIN HYDROCHLORIDE 0.4 MG: 0.4 CAPSULE ORAL at 08:29

## 2025-05-15 RX ADMIN — ENOXAPARIN SODIUM 70 MG: 100 INJECTION SUBCUTANEOUS at 21:56

## 2025-05-15 RX ADMIN — ENOXAPARIN SODIUM 40 MG: 100 INJECTION SUBCUTANEOUS at 08:29

## 2025-05-15 RX ADMIN — SODIUM CHLORIDE: 0.9 INJECTION, SOLUTION INTRAVENOUS at 04:47

## 2025-05-15 RX ADMIN — LEVETIRACETAM 500 MG: 500 TABLET, FILM COATED ORAL at 08:29

## 2025-05-15 RX ADMIN — LEVETIRACETAM 500 MG: 500 TABLET, FILM COATED ORAL at 21:57

## 2025-05-15 RX ADMIN — SODIUM CHLORIDE, PRESERVATIVE FREE 10 ML: 5 INJECTION INTRAVENOUS at 21:57

## 2025-05-15 RX ADMIN — SODIUM CHLORIDE, PRESERVATIVE FREE 10 ML: 5 INJECTION INTRAVENOUS at 08:29

## 2025-05-15 RX ADMIN — DOCUSATE SODIUM 100 MG: 100 CAPSULE, LIQUID FILLED ORAL at 10:02

## 2025-05-15 ASSESSMENT — PAIN SCALES - GENERAL
PAINLEVEL_OUTOF10: 0
PAINLEVEL_OUTOF10: 9
PAINLEVEL_OUTOF10: 0
PAINLEVEL_OUTOF10: 0
PAINLEVEL_OUTOF10: 8

## 2025-05-15 ASSESSMENT — PAIN DESCRIPTION - DESCRIPTORS
DESCRIPTORS: ACHING
DESCRIPTORS: ACHING

## 2025-05-15 ASSESSMENT — PAIN DESCRIPTION - ONSET
ONSET: GRADUAL
ONSET: GRADUAL

## 2025-05-15 ASSESSMENT — PAIN DESCRIPTION - LOCATION
LOCATION: HIP
LOCATION: HIP

## 2025-05-15 ASSESSMENT — PAIN DESCRIPTION - FREQUENCY
FREQUENCY: INTERMITTENT
FREQUENCY: INTERMITTENT

## 2025-05-15 ASSESSMENT — PAIN DESCRIPTION - PAIN TYPE
TYPE: ACUTE PAIN
TYPE: ACUTE PAIN

## 2025-05-15 ASSESSMENT — PAIN - FUNCTIONAL ASSESSMENT
PAIN_FUNCTIONAL_ASSESSMENT: PREVENTS OR INTERFERES SOME ACTIVE ACTIVITIES AND ADLS
PAIN_FUNCTIONAL_ASSESSMENT: PREVENTS OR INTERFERES SOME ACTIVE ACTIVITIES AND ADLS

## 2025-05-15 ASSESSMENT — PAIN DESCRIPTION - ORIENTATION: ORIENTATION: RIGHT

## 2025-05-15 NOTE — PLAN OF CARE
Problem: Pain  Goal: Verbalizes/displays adequate comfort level or baseline comfort level  5/15/2025 1126 by Rachael Luna RN  Outcome: Progressing  Flowsheets (Taken 5/14/2025 1336 by Kath Weller RN)  Verbalizes/displays adequate comfort level or baseline comfort level: Assess pain using appropriate pain scale  Note: Pt has current pain regimen in place. Monitoring and managing pain as need and q 4 hours.   5/15/2025 1123 by Rachael Luna, RN  Outcome: Progressing  Note: Current pain regimen is in place        Problem: Safety - Adult  Goal: Free from fall injury  5/15/2025 1126 by Rachael Luna, RN  Outcome: Progressing  Note: PT is here for a fall. He has a right femur fracture. Pt is a high fall risk. All fall interventions are in place. Pt is being monitored closely/.

## 2025-05-15 NOTE — CARE COORDINATION
PT and OT evals have been uploaded to [facility] for ARF/SNF review. CM left VM for patient's brother, Som Barraza, requesting call back to discuss choices and discharge planning.

## 2025-05-15 NOTE — PROGRESS NOTES
Assumed care of patient from MAX Cano (off going nurse). Patient alert and oriented. No apparent distress noted. Patient offers no concerns and can verbalize needs. Assessment to follow. Call bell within reach. Bed in lowest, locked position.

## 2025-05-15 NOTE — CONSULTS
[facility]    Name:   Jaxon Barraza   YOB: 1960  Date of Service:  05/15/25     Requesting Physician:   Dr. Nataliia Matute    Reason for Consultation  VTE    Chief Complaint  Fracture    History of Present Illness  Jaxon Barraza is a 65 y/o male who was admitted on May 14th after presenting to the ER with fall; tripped on stairs and fell on right side. XR showed femoral fracture. CT scan showed hemorrhage in the right iliopsoas muscle. He was admitted for further evaluation and management.     Past Medical and Surgical History  CVA  VTE  Seizures    Family History  Non-contributory    Social History  Denies tobacco use    Review of Systems  A complete ROS was done; negative except per HPI    Physical Exam  Vitals:    05/15/25 0755   BP: 108/80   Pulse: 81   Resp: 18   Temp: 98.8 °F (37.1 °C)   SpO2: 98%      Constitutional: alert, awake, in no acute distress  Eyes: PERRL, EOMI  ENT: normocephalic, atraumatic  Neck: supple, non-tender  Cardiovascular: normal perfusion, RRR  Respiratory: normal respiratory efforts, no wheezing  GI: soft, non-tender  Skin: warm, dry    Laboratory Data  Data from the last 24 hours reviewed    Assessment and Plan  Jaxon Barraza is a 63 yo male who was admitted with fall    History of Venous Thromboembolism  Patient with history of multiple episodes of DVT and PE, last apparently in 2021.  Last saw hematology in 2014. Was on rivaroxaban then.  Apparently restarted on rivaroxaban several years ago but per brother has been off rivaroxaban for several months now.  Given fracture, patient is at high risk for recurrent VTE.  Recommend trial of enoxaparin or heparin with close hemoglobin monitoring and short-interval CT imaging to evaluate for changes to hemorrhage in muscle.  Outpatient follow-up with hematology;     All questions from the patient were answered  Will continue to follow    Juan Hanks MD  [facility] News Office:

## 2025-05-15 NOTE — PROGRESS NOTES
met patient at bedside for a follow-up visit.     Patient is in the hospital because he fell and yesterday they were thinking of doing   surgery on him but plans have since changed. He just told me no surgery and looked   very happy. Patient's care-giver is still waiting for answers as he thinks surgery would   be better for his brother as it heals quicker-that's his thinking. Patient thanked    for the visit.     provided presence, support, prayer, and assurance of continued prayer.      Chaplains will provide follow-up care for patient and family as needed..      Spiritual Health History and Assessment/Progress Note  [facility]    Spiritual/Emotional Needs, Emotional distress, Adjustment to illness, Life Adjustments,      Name: Jaxon Barraza MRN: [id number]    Age: 64 y.o.     Sex: male   Language: English   Rastafari: None   Closed nondisplaced fracture of greater trochanter of right femur, initial encounter ([facility])     Date: 5/15/2025            Total Time Calculated: 15 min              Spiritual Assessment continued in [address] SURGICAL/ONCOLOGY        Referral/Consult From: Rounding   Encounter Overview/Reason: Spiritual/Emotional Needs  Service Provided For: Patient and family together    Angelica, Belief, Meaning:   Patient unable to assess at this time  Family/Friends have beliefs or practices that help with coping during difficult times      Importance and Influence:  Patient has no beliefs influential to healthcare decision-making identified during this visit  Family/Friends have no beliefs influential to healthcare decision-making identified during this visit    Community:  Patient feels well-supported. Support system includes: Friends  Family/Friends feel well-supported. Support system includes: Friends    Assessment and Plan of Care:     Patient Interventions include: Facilitated expression of thoughts and feelings  Family/Friends Interventions include:

## 2025-05-15 NOTE — PROGRESS NOTES
Physical Therapy Goals:  Initiated 5/15/2025 to be met within 7-10 days.  Short Term Goals  Short Term Goal 1: Patient will perform supine to/from sit with SBA for change of position.  Short Term Goal 2: Patient will perform sit to/from stand with CGA/RW PWB R LE in prep for ambulation activity.  Short Term Goal 3: Patient will perform ambulation 70 ft with CGA/RW PWB R LE for increased functional mobility.    []  Patient has met MD mobilization rachael for d/c home   []  Recommend HH with 24 hour adult care   [x]  Benefit from additional acute PT session to address:  Functional mobility and ROM/motor performance deficits      PHYSICAL THERAPY EVALUATION    Patient: Jaxon Barraza (64 y.o. male)  Date: 5/15/2025  Primary Diagnosis: Closed nondisplaced fracture of greater trochanter of right femur, initial encounter ([facility]) [S72.114A]  Closed disp intertrochanteric fracture of right femur with malunion [S72.141P]  Precautions: Weight Bearing, Fall Risk, Right Lower Extremity Weight Bearing: Partial Weight Bearing   PLOF: ambulating w/o AD independently; lives with brother    ASSESSMENT :  The patient is seen on medical unit following admission for above dx (son-surgical intervention).  PT introduced self to pt, pt brother and explained role of IP PT.  Pt with high cognitive impairment and high anxiety with regard to participation with PT session.  Pt seen with OT for second set of skilled hands.  Requires frequent vc, encouragement and reassurance for participation t/o session.  Based on the objective data described below, patient presents with decreased independence in functional mobility r/t decr'd DONI and decr'd functional strength R LE, impaired bed mobility and transfers, decr'd balance, and decr'd gait quality..  Decr'd overall activity tolerance and pain also impacting function.  Pt received supine in bed and reports \"moderate\" pain.  Iv in place L UE.  Pt on regular bed, with significant difficulty  flowsheet for vital signs taken during this treatment.    After treatment:   []         Patient left in no apparent distress sitting up in chair  [x]         Patient left in no apparent distress in bed  [x]         Call bell left within reach  [x]         Nursing notified  [x]         Caregiver present  [x]         Bed alarm activated  [x]         SCDs applied    COMMUNICATION/EDUCATION:   Patient Education  Education Given To: Patient;Family (Pt brother Ed present during evaluation)  Education Provided: Role of Therapy;Plan of Care;Transfer Training;Mobility Training;Fall Prevention Strategies;Equipment;Precautions  Education Method: Verbal;Demonstration  Barriers to Learning: Cognition  Education Outcome: Verbalized understanding;Demonstrated understanding;Continued education needed    Thank you for this referral.  Medardo Rowley, PT  Minutes: 57      Eval Complexity: Decision Making: Medium Complexity

## 2025-05-15 NOTE — PLAN OF CARE
Problem: Chronic Conditions and Co-morbidities  Goal: Patient's chronic conditions and co-morbidity symptoms are monitored and maintained or improved  5/14/2025 1336 by Kath Weller RN  Outcome: Progressing  Flowsheets (Taken 5/14/2025 1336)  Care Plan - Patient's Chronic Conditions and Co-Morbidity Symptoms are Monitored and Maintained or Improved: Monitor and assess patient's chronic conditions and comorbid symptoms for stability, deterioration, or improvement     Problem: Pain  Goal: Verbalizes/displays adequate comfort level or baseline comfort level  5/14/2025 2145 by Silviano Prater RN  Outcome: Progressing  5/14/2025 1336 by Kath Weller, RN  Flowsheets (Taken 5/14/2025 1336)  Verbalizes/displays adequate comfort level or baseline comfort level: Assess pain using appropriate pain scale     Problem: ABCDS Injury Assessment  Goal: Absence of physical injury  Outcome: Progressing     Problem: Safety - Adult  Goal: Free from fall injury  Outcome: Progressing

## 2025-05-15 NOTE — PROGRESS NOTES
Hospitalist Progress Note    Patient: Jaxon Barraza MRN: [id number]  CSN: [id number]    YOB: 1960  Age: 64 y.o.  Sex: male    DOA: 5/13/2025 LOS:  LOS: 2 days         Total duration of encounter: 2 days      Chief Complaint ;  Very pleasant and cooperative 64-year-old gentleman who slipped on the stairs and has suffered a right nondisplaced intertrochanteric femur fracture. It appears nondisplaced. Orthopedics has been consulted. No recommendation at this time as to need for surgery. CT scan also shows a right iliopsoas hemorrhage.     Subjective ;  I feel fine and I was told no surgery     Called [facility] (Other)  [phone] (Home Phone)  with his brother- ED  message left.   Review of systems:  Constitutional: denies fevers, chills, myalgias  Respiratory: denies SOB, cough  Cardiovascular: denies chest pain, palpitations  Gastrointestinal: denies nausea, vomiting, diarrhea      10 systems reviewed, all negative other than what is mentioned above.      Vital signs/Intake and Output:  Visit Vitals  /75   Pulse 87   Temp 98.8 °F (37.1 °C) (Oral)   Resp 18   Ht 1.854 m (6' 1\")   Wt 74.8 kg (165 lb)   SpO2 98%   BMI 21.77 kg/m²     Current Shift:  05/15 0701 - 05/15 1900  In: 240 [P.O.:240]  Out: -   Last three shifts:  05/13 1901 - 05/15 0700  In: -   Out: 1900 [Urine:1900]    Exam:    General: Well developed, alert, NAD, OX3  Head/Neck: NCAT, supple, No masses, No lymphadenopathy  CVS:Regular rate and rhythm, no M/R/G, S1/S2 heard, no thrill  Lungs:Clear to auscultation bilaterally, no wheezes, rhonchi, or rales  Abdomen: Soft, Nontender, No distention, Normal Bowel sounds, No hepatomegaly  Extremities: No C/C/E, pulses palpable 2+  Skin:normal texture and turgor, no rashes, no lesions  Neuro:grossly normal , follows commands. Let   Psych:appropriate    Labs: Results:       Chemistry Recent Labs     05/14/25  0514 05/15/25  0329   GLUCOSE 91 95    140   K 3.9 4.1    106   CO2

## 2025-05-15 NOTE — PROGRESS NOTES
Occupational Therapy Goals:  Initiated 5/15/2025 to be met within 7-10 days.  Short Term Goals  Time Frame for Short Term Goals: 7 days  Short Term Goal 1: Patient will complete toilet transfer with SBA  Short Term Goal 2: Patient will complete bathing with SBA  Short Term Goal 3: Patient will complete grooming in standing at sink with SBA  Short Term Goal 4: Patient will complete dressing with SBA  Short Term Goal 5: Patient will complete toileting with SBA    OCCUPATIONAL THERAPY EVALUATION/TREATMENT    Patient: Jaxon Barraza (64 y.o. male)  Date: 5/15/2025  Primary Diagnosis: Closed nondisplaced fracture of greater trochanter of right femur, initial encounter ([facility]) [S72.114A]  Closed disp intertrochanteric fracture of right femur with malunion [S72.141P]       Precautions: Fall Risk, Weight Bearing, Right Lower Extremity Weight Bearing: Partial Weight Bearing,  ,  ,  ,  ,  ,               PLOF: Patient completed ADLs independently with No device    ASSESSMENT :  RN cleared patient for participation in OT evaluation. Patient presented supine in bed with gripper socks and [place]. Patient with visitor (brother Ed) present for entire session. Patient completed assessment of ADLs and BUE strength, ROM (see details below). Due to deficits (listed below) patient has decreased independence with ADLs and functional mobility and would benefit from continued occupational therapy services.  ,    TREATMENT:  Patient completed supine to sit. Patient fearful, anxious, but completed after encouragement from therapist and family. Patient reporting some lightheadedness on sitting, improved with time. Reporting moderate pain in RLE, reporting improved from when supine. Patient given additional time throughout session d/t being nervous about movement. Patient educated on PWB. Patient completed functional mobility with RWSIA in room. Returned to bed at end of session.     DEFICITS/IMPAIRMENTS:  Performance deficits /  Impairments: Decreased functional mobility ;Decreased high-level IADLs;Decreased ADL status;Decreased endurance;Decreased ROM;Decreased strength;Decreased balance;Decreased coordination;Decreased cognition    Patient will benefit from skilled intervention to address the above impairments.  Patient's rehabilitation potential is considered to be Prognosis: Good.  Factors which may influence rehabilitation potential include:   []             None noted  []             Mental ability/status  [x]             Medical condition  [x]             Home/family situation and support systems  [x]             Safety awareness  [x]             Pain tolerance/management  []             Other:      PLAN :  Recommendations and Planned Interventions:      [x]               Self Care/ ADL Training         [x]      Therapeutic Activities  [x]               Functional Mobility Training   []      Cognitive Retraining  [x]               Therapeutic Exercises           [x]      Endurance Activities  [x]               Balance Training                    []      Neuromuscular Re-Education  []               Visual/Perceptual Training     [x]      Home Safety Training  [x]               Patient Education                   [x]      Family Training/Education  [x]               Strengthening                        [x]      ROM  []               Wheelchair mobility                [x]     Pain management  [x]               Safety education/training       [x]     Positioning  []              Equipment education              []     Return to work training  [x]              Coordination training              []      Energy conservation  [x]              Home management training and IADLs  []               Other (comment):  Frequency/Duration: Patient will be followed by occupational therapy to address goals, 1 time per day/3-5 days per week to address goals.    Further Equipment Recommendations for Discharge:  ,    TBD at next level of care, RW, BSC,

## 2025-05-16 ENCOUNTER — APPOINTMENT (OUTPATIENT)
Facility: HOSPITAL | Age: 65
End: 2025-05-16
Payer: MEDICARE

## 2025-05-16 LAB
ANION GAP SERPL CALC-SCNC: 12 MMOL/L (ref 3–18)
BUN SERPL-MCNC: 14 MG/DL (ref 6–23)
BUN/CREAT SERPL: 15 (ref 12–20)
CALCIUM SERPL-MCNC: 8.9 MG/DL (ref 8.5–10.1)
CHLORIDE SERPL-SCNC: 102 MMOL/L (ref 98–107)
CO2 SERPL-SCNC: 22 MMOL/L (ref 21–32)
CREAT SERPL-MCNC: 0.94 MG/DL (ref 0.6–1.3)
ERYTHROCYTE [DISTWIDTH] IN BLOOD BY AUTOMATED COUNT: 12.9 % (ref 11.6–14.5)
GLUCOSE SERPL-MCNC: 156 MG/DL (ref 74–108)
HCT VFR BLD AUTO: 38.7 % (ref 36–48)
HGB BLD-MCNC: 13 G/DL (ref 13–16)
MCH RBC QN AUTO: 32.5 PG (ref 24–34)
MCHC RBC AUTO-ENTMCNC: 33.6 G/DL (ref 31–37)
MCV RBC AUTO: 96.8 FL (ref 78–100)
NRBC # BLD: 0 K/UL (ref 0–0.01)
NRBC BLD-RTO: 0 PER 100 WBC
PLATELET # BLD AUTO: 109 K/UL (ref 135–420)
PMV BLD AUTO: 9.9 FL (ref 9.2–11.8)
POTASSIUM SERPL-SCNC: 3.8 MMOL/L (ref 3.5–5.5)
RBC # BLD AUTO: 4 M/UL (ref 4.35–5.65)
SODIUM SERPL-SCNC: 136 MMOL/L (ref 136–145)
WBC # BLD AUTO: 4.9 K/UL (ref 4.6–13.2)

## 2025-05-16 PROCEDURE — 6360000004 HC RX CONTRAST MEDICATION: Performed by: INTERNAL MEDICINE

## 2025-05-16 PROCEDURE — 6360000002 HC RX W HCPCS: Performed by: HOSPITALIST

## 2025-05-16 PROCEDURE — 6370000000 HC RX 637 (ALT 250 FOR IP): Performed by: HOSPITALIST

## 2025-05-16 PROCEDURE — 2500000003 HC RX 250 WO HCPCS: Performed by: INTERNAL MEDICINE

## 2025-05-16 PROCEDURE — 97116 GAIT TRAINING THERAPY: CPT

## 2025-05-16 PROCEDURE — 2580000003 HC RX 258: Performed by: HOSPITALIST

## 2025-05-16 PROCEDURE — 1100000000 HC RM PRIVATE

## 2025-05-16 PROCEDURE — 36415 COLL VENOUS BLD VENIPUNCTURE: CPT

## 2025-05-16 PROCEDURE — 80048 BASIC METABOLIC PNL TOTAL CA: CPT

## 2025-05-16 PROCEDURE — 73701 CT LOWER EXTREMITY W/DYE: CPT

## 2025-05-16 PROCEDURE — 85027 COMPLETE CBC AUTOMATED: CPT

## 2025-05-16 PROCEDURE — 6370000000 HC RX 637 (ALT 250 FOR IP): Performed by: INTERNAL MEDICINE

## 2025-05-16 PROCEDURE — 97530 THERAPEUTIC ACTIVITIES: CPT

## 2025-05-16 RX ORDER — SODIUM CHLORIDE 9 MG/ML
INJECTION, SOLUTION INTRAVENOUS CONTINUOUS
Status: DISPENSED | OUTPATIENT
Start: 2025-05-16 | End: 2025-05-17

## 2025-05-16 RX ORDER — IOPAMIDOL 612 MG/ML
80 INJECTION, SOLUTION INTRAVASCULAR
Status: COMPLETED | OUTPATIENT
Start: 2025-05-16 | End: 2025-05-16

## 2025-05-16 RX ADMIN — DOCUSATE SODIUM 100 MG: 100 CAPSULE, LIQUID FILLED ORAL at 09:26

## 2025-05-16 RX ADMIN — ENOXAPARIN SODIUM 70 MG: 100 INJECTION SUBCUTANEOUS at 20:34

## 2025-05-16 RX ADMIN — SODIUM CHLORIDE: 0.9 INJECTION, SOLUTION INTRAVENOUS at 09:33

## 2025-05-16 RX ADMIN — ENOXAPARIN SODIUM 70 MG: 100 INJECTION SUBCUTANEOUS at 09:26

## 2025-05-16 RX ADMIN — OXYCODONE AND ACETAMINOPHEN 1 TABLET: 325; 5 TABLET ORAL at 17:48

## 2025-05-16 RX ADMIN — ACETAMINOPHEN 650 MG: 325 TABLET ORAL at 20:34

## 2025-05-16 RX ADMIN — LEVETIRACETAM 500 MG: 500 TABLET, FILM COATED ORAL at 20:35

## 2025-05-16 RX ADMIN — TAMSULOSIN HYDROCHLORIDE 0.4 MG: 0.4 CAPSULE ORAL at 09:26

## 2025-05-16 RX ADMIN — LEVETIRACETAM 500 MG: 500 TABLET, FILM COATED ORAL at 09:26

## 2025-05-16 RX ADMIN — SODIUM CHLORIDE, PRESERVATIVE FREE 10 ML: 5 INJECTION INTRAVENOUS at 20:35

## 2025-05-16 RX ADMIN — POLYETHYLENE GLYCOL 3350 17 G: 17 POWDER, FOR SOLUTION ORAL at 13:03

## 2025-05-16 RX ADMIN — SODIUM CHLORIDE, PRESERVATIVE FREE 10 ML: 5 INJECTION INTRAVENOUS at 09:26

## 2025-05-16 RX ADMIN — DOCUSATE SODIUM 100 MG: 100 CAPSULE, LIQUID FILLED ORAL at 20:35

## 2025-05-16 RX ADMIN — IOPAMIDOL 80 ML: 612 INJECTION, SOLUTION INTRAVENOUS at 11:28

## 2025-05-16 ASSESSMENT — PAIN DESCRIPTION - ORIENTATION
ORIENTATION: RIGHT
ORIENTATION: RIGHT

## 2025-05-16 ASSESSMENT — PAIN DESCRIPTION - LOCATION
LOCATION: LEG
LOCATION: HIP

## 2025-05-16 ASSESSMENT — PAIN SCALES - GENERAL
PAINLEVEL_OUTOF10: 9
PAINLEVEL_OUTOF10: 3
PAINLEVEL_OUTOF10: 7

## 2025-05-16 ASSESSMENT — PAIN DESCRIPTION - DESCRIPTORS: DESCRIPTORS: ACHING

## 2025-05-16 NOTE — PLAN OF CARE
Problem: Chronic Conditions and Co-morbidities  Goal: Patient's chronic conditions and co-morbidity symptoms are monitored and maintained or improved  5/15/2025 1126 by Rachael Luna RN  Outcome: Progressing     Problem: Pain  Goal: Verbalizes/displays adequate comfort level or baseline comfort level  5/16/2025 0003 by Silviano Prater RN  Outcome: Progressing  5/15/2025 1126 by Rachael Luna RN  Outcome: Progressing  Flowsheets (Taken 5/14/2025 1336 by Kath Weller RN)  Verbalizes/displays adequate comfort level or baseline comfort level: Assess pain using appropriate pain scale  Note: Pt has current pain regimen in place. Monitoring and managing pain as need and q 4 hours.   5/15/2025 1123 by Rachael Luna RN  Outcome: Progressing  Note: Current pain regimen is in place      Problem: Skin/Tissue Integrity  Goal: Skin integrity remains intact  Description: 1.  Monitor for areas of redness and/or skin breakdown2.  Assess vascular access sites hourly3.  Every 4-6 hours minimum:  Change oxygen saturation probe site4.  Every 4-6 hours:  If on nasal continuous positive airway pressure, respiratory therapy assess nares and determine need for appliance change or resting period  5/16/2025 0003 by Silviano Prater RN  Outcome: Progressing  5/15/2025 1126 by Rachael Luna RN  Outcome: Progressing     Problem: ABCDS Injury Assessment  Goal: Absence of physical injury  5/16/2025 0003 by Silviano Prater RN  Outcome: Progressing  5/15/2025 1126 by Rachael Luna RN  Outcome: Progressing     Problem: Safety - Adult  Goal: Free from fall injury  5/16/2025 0003 by Silviano Prater RN  Outcome: Progressing  5/15/2025 1126 by Rachael Luna RN  Outcome: Progressing  Note: PT is here for a fall. He has a right femur fracture. Pt is a high fall risk. All fall interventions are in place. Pt is being monitored closely/.   5/15/2025 1123 by Rachael Luna RN  Outcome: Progressing  Note: Pt is admitted for a fall. His right

## 2025-05-16 NOTE — PROGRESS NOTES
Physical Therapy Goals:  Initiated 5/16/2025 to be met within 7-10 days.  Short Term Goals  Short Term Goal 1: Patient will perform supine to/from sit with SBA for change of position.  Short Term Goal 2: Patient will perform sit to/from stand with CGA/RW PWB R LE in prep for ambulation activity.  Short Term Goal 3: Patient will perform ambulation 70 ft with CGA/RW PWB R LE for increased functional mobility.        PHYSICAL THERAPY TREATMENT    Patient: Jaxon Barraza (64 y.o. male)  Date: 5/16/2025  Diagnosis: Closed nondisplaced fracture of greater trochanter of right femur, initial encounter ([facility]) [S72.114A]  Closed disp intertrochanteric fracture of right femur with malunion [S72.141P] Closed nondisplaced fracture of greater trochanter of right femur, initial encounter ([facility])  Precautions: Weight Bearing, Fall Risk, Right Lower Extremity Weight Bearing: Partial Weight Bearing  PLOF: independent w/o AD    ASSESSMENT:  Pt found reclined in bed upon PT arrival. Pain reported as \"a little\" t/o session.  Pt pleasant and cooperative, eager to participate with PT session.  Requires min A supine <> sit.  STS with RW/CGA with vc/tc for hand placement.  increasing independence noted.   Participates with GT 30ft with RW/CG/min A.   Gait slow, mildly antalgic with step to gt pattern and pt tending to position R LE Ext Rotated, correcting only briefly with vc.  Pt returned to room and assist back to bed with all needs in reach.  Pt brother present and supportive.  Nurse Daniela notified of session outcome.  Pt will benefit from continued PT to improve functional mobility including gait quality, safety and independence.    Progression toward goals:   []      Improving appropriately and progressing toward goals  [x]      Improving slowly and progressing toward goals  []      Not making progress toward goals and plan of care will be adjusted     PLAN:  Patient continues to benefit from skilled intervention to address the above  impairments.  Continue treatment per established plan of care.    Further Equipment Recommendations for Discharge: n/a    [facility]:  AM-PAC Inpatient Mobility without Stair Climbing Raw Score : 14      Current research shows that an AM-PAC score of 17 (13 without stairs) or less is not associated with a discharge to the patient's home setting. Based on an AM-PAC score and their current functional mobility deficits, it is recommended that the patient have 5-7 sessions per week of Physical Therapy at d/c to increase the patient's independence.  Currently, this patient demonstrates the potential endurance, and/or tolerance for 3 hours of therapy each day at d/c.    Current research shows that an AM-PAC score of 17 (13 without stairs) or less is not associated with a discharge to the patient's home setting. Based on an AM-PAC score and their current functional mobility deficits, it is recommended that the patient have 3-5 sessions per week of Physical Therapy at d/c to increase the patient's independence.     Current research shows that an AM-PAC score of 18 (14 without stairs) or greater is associated with a discharge to the patient's home setting. Based on an AM-PAC score and their current functional mobility deficits, it is recommended that the patient have 2-3 sessions per week of Physical Therapy at d/c to increase the patient's independence.    At this time and based on an AM-PAC score, no further PT is recommended upon discharge due to (i.e. patient at baseline functional status…etc…).  Recommend patient returns to prior setting with prior services.    This [facility] score should be considered in conjunction with interdisciplinary team recommendations to determine the most appropriate discharge setting. Patient's social support, diagnosis, medical stability, and prior level of function should also be taken into consideration.     SUBJECTIVE:   Patient stated Subjective: I want to try to walk.    OBJECTIVE DATA SUMMARY:

## 2025-05-16 NOTE — PROGRESS NOTES
Care  assisting Care Mgr Kassidy Friend RN with Discharge Planning needs for patient.  Updates sent to [facility] (Acute Rehab), area Skilled Nursing Facilities (SNF's) and area Home Health Agencies for review and consideration for placement and/or acceptance for possible home care needs. (Patient's preferences).    Will follow along for further needs.

## 2025-05-16 NOTE — PROGRESS NOTES
Hospitalist Progress Note    Patient: Jaxon Barraza MRN: [id number]  CSN: [id number]    YOB: 1960  Age: 64 y.o.  Sex: male    DOA: 5/13/2025 LOS:  LOS: 3 days         Total duration of encounter: 3 days      Chief Complaint ;  Very pleasant and cooperative 64-year-old gentleman who slipped on the stairs and has suffered a right nondisplaced intertrochanteric femur fracture. It appears nondisplaced. Orthopedics has been consulted. No recommendation at this time as to need for surgery. CT scan also shows a right iliopsoas hemorrhage.     Subjective ;  I feel ok, pain is ok and worked with physical therapist   Will repeat ct scan for interval change of hematoma   Discussed with dr. Hanks   Review of systems:  Constitutional: denies fevers, chills, myalgias  Respiratory: denies SOB, cough  Cardiovascular: denies chest pain, palpitations  Gastrointestinal: denies nausea, vomiting, diarrhea      10 systems reviewed, all negative other than what is mentioned above.      Vital signs/Intake and Output:  Visit Vitals  /77   Pulse 85   Temp 98.1 °F (36.7 °C) (Oral)   Resp 18   Ht 1.854 m (6' 1\")   Wt 74.8 kg (165 lb)   SpO2 97%   BMI 21.77 kg/m²     Current Shift:  No intake/output data recorded.  Last three shifts:  05/14 1901 - 05/16 0700  In: 480 [P.O.:480]  Out: 3050 [Urine:3050]    Exam:    General: Well developed, alert, NAD, OX3  Head/Neck: NCAT, supple, No masses, No lymphadenopathy  CVS:Regular rate and rhythm, no M/R/G, S1/S2 heard, no thrill  Lungs:Clear to auscultation bilaterally, no wheezes, rhonchi, or rales  Abdomen: Soft, Nontender, No distention, Normal Bowel sounds, No hepatomegaly  Extremities: No C/C/E, pulses palpable 2+  Skin:normal texture and turgor, no rashes, no lesions  Neuro:grossly normal , follows commands. Let   Psych:appropriate    Labs: Results:       Chemistry Recent Labs     05/14/25  0514 05/15/25  0329 05/16/25  0255   GLUCOSE 91 95 156*    140 136   K 3.9  recommendation trail of lovenox -close monitoring hematoma and hh per hematologist recommendation   Need to f/u with hematologist      History of stroke.  -History noted.       Discharge to; , [x] Home  [x]SNF/Rehab  []  Others  in  2 Days, []  stable for DC         Case discussed with:  [x]Patient  [x]Family  [x]Nursing  [x]Case Management [] Consultants  DVT Prophylaxis:  []Lovenox  []Hep SQ  [x]SCDs  []Coumadin, Eliquis, Xarelto, Pradaxa   []On Heparin gtt. [] No indication [] refused     TIME: 55 minutes were spent on the care of this patient today,  This time also includes physician non-face-to-face service time visit on the date of service such as  Preparing to see the patient (eg, review of tests)  Obtaining and/or reviewing separately obtained history  Performing a medically necessary appropriate examination and/or evaluation  Counseling and educating the patient/family/caregiver  Ordering medications, tests, or procedures  Referring and communicating with other health care professionals as needed  Documenting clinical information in the electronic or other health record  Independently interpreting results (not reported separately) and communicating results to the patient/family/caregiver  Care coordination and discharge planning with Case Management.      Dear patient, if you are reviewing this note and have a question regarding the medical terminology, please bring it with you to your next PCP visit.  Medical notes are meant to be a communication between medical professionals.  Additionally, portion of this note were created using voice recognition function, syntax and phonetic over may have escaped proofreading.    Nataliia Matute MD

## 2025-05-16 NOTE — CARE COORDINATION
8:54 AM- This CM spoke to patient's son, Ed, regarding discharge planning. Patient's brother would like for patient to go to [facility] and states that he has been at that facility in the past. CM spoke to Jaqui Trevizo from [facility] who is on the floor to evaluate the patient and who also made contact with patient's brother. Per Jaqui, she will send patient's information to their MD for a decision regarding if they will accept patient for ARF. Patient's brother is aware that if not determined appropriate for ARF, will have to go to rehab bed at SNF. CM will continue to follow.     11:15 AM- Per IDR, patient can DC tomorrow. Jaqui Trevizo at [facility] states that she is not able to state if she can accommodate patient for a be tomorrow or Sunday. CM spoke to patient's brother, Ed in room to discuss other accepting facilities. Patient's brother states he would like [facility] or [place]. Per Mickie Mendoza at [facility], they do not have any beds until next week. Per Althea at [facility], [facility] has a bed available tomorrow. CM alerted patient's son Ed who verbalizes understanding and agreement.     1 PM- Plan is for patient to go to [facility] tomorrow with transport at 1 PM. Patient will not get therapy on the weekends so patient's son Ed requesting that patient get PT before he leaves hospital tomorrow. CM reached out to therapy manager to request a therapy visit early tomorrow before patient transports to [facility] at 1 PM.

## 2025-05-17 VITALS
WEIGHT: 165 LBS | DIASTOLIC BLOOD PRESSURE: 84 MMHG | HEART RATE: 97 BPM | HEIGHT: 73 IN | SYSTOLIC BLOOD PRESSURE: 127 MMHG | BODY MASS INDEX: 21.87 KG/M2 | OXYGEN SATURATION: 98 % | RESPIRATION RATE: 17 BRPM | TEMPERATURE: 97.5 F

## 2025-05-17 LAB — LEVETIRACETAM SERPL-MCNC: 15.5 UG/ML (ref 10–40)

## 2025-05-17 PROCEDURE — 6370000000 HC RX 637 (ALT 250 FOR IP): Performed by: HOSPITALIST

## 2025-05-17 PROCEDURE — 6370000000 HC RX 637 (ALT 250 FOR IP): Performed by: INTERNAL MEDICINE

## 2025-05-17 PROCEDURE — 6360000002 HC RX W HCPCS: Performed by: HOSPITALIST

## 2025-05-17 RX ORDER — OXYCODONE AND ACETAMINOPHEN 5; 325 MG/1; MG/1
1 TABLET ORAL EVERY 4 HOURS PRN
Qty: 6 TABLET | Refills: 0 | Status: SHIPPED | OUTPATIENT
Start: 2025-05-17 | End: 2025-05-18

## 2025-05-17 RX ORDER — LAMOTRIGINE 100 MG/1
100 TABLET ORAL DAILY
Status: DISCONTINUED | OUTPATIENT
Start: 2025-05-17 | End: 2025-05-17

## 2025-05-17 RX ORDER — LAMOTRIGINE 100 MG/1
100 TABLET ORAL 2 TIMES DAILY
Status: DISCONTINUED | OUTPATIENT
Start: 2025-05-17 | End: 2025-05-17 | Stop reason: HOSPADM

## 2025-05-17 RX ORDER — LAMOTRIGINE 100 MG/1
100 TABLET ORAL 2 TIMES DAILY
Status: ON HOLD | COMMUNITY
End: 2025-05-17

## 2025-05-17 RX ORDER — LAMOTRIGINE 100 MG/1
100 TABLET ORAL 2 TIMES DAILY
Qty: 2 TABLET | Refills: 0 | Status: SHIPPED | OUTPATIENT
Start: 2025-05-17 | End: 2025-05-18

## 2025-05-17 RX ADMIN — OXYCODONE AND ACETAMINOPHEN 1 TABLET: 325; 5 TABLET ORAL at 08:45

## 2025-05-17 RX ADMIN — TAMSULOSIN HYDROCHLORIDE 0.4 MG: 0.4 CAPSULE ORAL at 08:44

## 2025-05-17 RX ADMIN — OXYCODONE AND ACETAMINOPHEN 1 TABLET: 325; 5 TABLET ORAL at 13:26

## 2025-05-17 RX ADMIN — ENOXAPARIN SODIUM 70 MG: 100 INJECTION SUBCUTANEOUS at 08:44

## 2025-05-17 RX ADMIN — LEVETIRACETAM 500 MG: 500 TABLET, FILM COATED ORAL at 08:44

## 2025-05-17 RX ADMIN — DOCUSATE SODIUM 100 MG: 100 CAPSULE, LIQUID FILLED ORAL at 08:44

## 2025-05-17 RX ADMIN — LAMOTRIGINE 100 MG: 100 TABLET ORAL at 11:57

## 2025-05-17 RX ADMIN — POLYETHYLENE GLYCOL 3350 17 G: 17 POWDER, FOR SOLUTION ORAL at 08:43

## 2025-05-17 RX ADMIN — OXYCODONE AND ACETAMINOPHEN 1 TABLET: 325; 5 TABLET ORAL at 00:42

## 2025-05-17 ASSESSMENT — PAIN DESCRIPTION - LOCATION
LOCATION: HIP
LOCATION: HIP
LOCATION: LEG

## 2025-05-17 ASSESSMENT — PAIN DESCRIPTION - DESCRIPTORS: DESCRIPTORS: ACHING

## 2025-05-17 ASSESSMENT — PAIN SCALES - GENERAL
PAINLEVEL_OUTOF10: 5
PAINLEVEL_OUTOF10: 8
PAINLEVEL_OUTOF10: 5

## 2025-05-17 ASSESSMENT — PAIN DESCRIPTION - ORIENTATION
ORIENTATION: RIGHT

## 2025-05-17 NOTE — CARE COORDINATION
Medicare pt has received, reviewed, and signed 2nd Important Message from Medicare letter informing them of their right to appeal the discharge.  Signed copy has been placed on pt bedside chart.

## 2025-05-17 NOTE — PROGRESS NOTES
Transition of care to SNF: [facility]      Communication to Patient/Family:  Met with patient and family and they are agreeable to the transition plan. The Plan for Transition of Care is related to the following treatment goals: continued rehab    The Patient was provided with a choice of provider and agrees   with the discharge plan.  Yes [x] No []    Freedom of choice list was provided with basic dialogue that supports the patient's individualized plan of care/goals and shares the quality data associated with the providers.     Yes [] No []    SNF/Rehab Transition:  Patient has been accepted to [facility] at [address] [place] [address] for SNF/Rehab and meets criteria for admission.   Patient will transported by [facility] and expected to leave at 1200.    Communication to SNF/Rehab:  Bedside RN, Daniela, has been notified to update the transition plan to the facility and call report [phone].    Discharge information has been updated on the AVS and communicated via [facility] and/or CC link.   Discharge instructions to be fax'd to facility at [phone] per request.     Please include all hard scripts for controlled substances, med rec and dc summary in packet. Please medicate for pain prior to dc if possible and needed to help offset delay when patient first arrives to facility.    Reviewed and confirmed with facility, Keon SALTER can manage the patient care needs for the following:     Boo with (X) only those applicable:  Medication:  [x]Medications are available at the facility  []IV Antibiotics    []Controlled Substance - hard copies available sent.  []Weekly Labs    Equipment:  []CPAP/BiPAP  []Wound Vacuum  []Sharma or Urinary Device  []PICC/Central Line  []Nebulizer  []Ventilator    Treatment:  []Isolation (for MRSA, VRE, etc.)  []Surgical Drain Management  []Tracheostomy Care  []Dressing Changes  []Dialysis with transportation  []PEG Care  []Oxygen  []Daily Weights for Heart Failure

## 2025-05-17 NOTE — DISCHARGE SUMMARY
Discharge Summary    Patient: Jaxon Barraza               Sex: male          DOA: 5/13/2025         YOB: 1960      Age:  64 y.o.        LOS:  LOS: 4 days                Admit Date: 5/13/2025    Discharge Date: 5/17/2025    Admission Diagnoses: Closed nondisplaced fracture of greater trochanter of right femur, initial encounter ([facility]) [S72.114A]  Closed disp intertrochanteric fracture of right femur with malunion [S72.141P]    Discharge Diagnoses:    Hospital Problems           Last Modified POA    * (Principal) Closed nondisplaced fracture of greater trochanter of right femur, initial encounter ([facility]) 5/13/2025 Yes    Closed disp intertrochanteric fracture of right femur with malunion 5/14/2025 Yes    Seizure disorder ([facility]) 5/14/2025 Yes    History of DVT (deep vein thrombosis) 5/14/2025 Yes    Anticoagulated 5/14/2025 Yes        Discharge Condition: Stable    Hospital Course ;   64-year-old gentleman with past medical history of stroke, DVT, pulmonary embolism and seizure disorder followed by [place] [facility] here after a fall. Was descending 3 steps. Tripped on the last 1 and then fell onto right thigh. Felt immediate pain. Unable to bear weight. Denies seizure. Denies loss of consciousness. Did not hit head. Tells he takes Xarelto at home that he receives through the patient assistance program. X-ray shows nondisplaced femoral fracture-intertrochanteric. Fracture subtle per radiology. CT scan also shows hemorrhage right iliopsoas muscle. Dr. Hayward of orthopedics consulted.   HE IS ADMITTED FOR FEMUR FRACTURE   Right nondisplaced intertrochanteric femur fracture.  - Orthopedics consultation RECOMMEND medical management   - Pain management.-encourage po pain meds   Bowel management   HE NEEDS TO CONTINUE TO F/U WITH ORTHOPEDICS AFTER DC         Right iliopsoas hemorrhage   REPEATED CT  SCAN-SIDE DECREASED-CAN HAVE IMAGINE AS NEEDED         Seizure disorder.  -Continue LAMOTRIGINE 100 MG  non-occlusive deep vein thrombosis in the left distal femoral vein.   Chronic non-occlusive deep vein thrombosis in the left popliteal vein.   No evidence of deep vein thrombosis in the right lower extremity.   No evidence of acute deep vein thrombosis in the left lower extremity.     CTA CHEST W WO CONTRAST  Result Date: 5/14/2025  EXAM:  CTA CHEST W WO CONTRAST INDICATION: Shortness of breath, history of pulmonary embolism, recent femur fracture COMPARISON: December 2010 TECHNIQUE: Helical thin section chest CT following intravenous administration of nonionic contrast 100 mL of isovue 370 according to departmental PE protocol. Coronal and sagittal reformats were performed. 3D post processing was performed.  CT dose reduction was achieved through the use of a standardized protocol tailored for this examination and automatic exposure control for dose modulation. FINDINGS: This is a good quality study for the evaluation of pulmonary embolism to the first subsegmental arterial level. There is no pulmonary embolism to this level. MEDIASTINUM: No mass or lymphadenopathy. VINCENT: No mass or lymphadenopathy. THORACIC AORTA: No aneurysm. HEART: Normal in size. ESOPHAGUS: No wall thickening or dilatation. TRACHEA/BRONCHI: Patent. PLEURA: No effusion or pneumothorax. LUNGS: No nodule, mass, or airspace disease. UPPER ABDOMEN: Partially imaged. No acute pathology. BONES: Age indeterminant mild compression deformity at the superior endplate of T10.     No evidence of pulmonary embolism or pneumonia. Electronically signed by MADDY BORJAS    CT FEMUR RIGHT WO CONTRAST  Result Date: 5/13/2025  EXAM: CT FEMUR RIGHT WO CONTRAST INDICATION: Right hip pain. Right femoral intertrochanteric fracture on x-ray. COMPARISON: Right femur views on the same day at 1959 hours. TECHNIQUE: Helical CT of the right femur with coronal and sagittal reformats. Images reviewed in soft tissue and bone windows.  CT dose reduction was achieved through the

## 2025-05-17 NOTE — PROGRESS NOTES
Physical Therapy    1130: Attempted to treat pt before transfer to SNF as requested, but pt refusing, stated he already walked.  Asked again and pt still refused.

## 2025-05-17 NOTE — PLAN OF CARE
Problem: Pain  Goal: Verbalizes/displays adequate comfort level or baseline comfort level  5/16/2025 2330 by Alex Cates RN  Outcome: Progressing  Flowsheets (Taken 5/16/2025 2330)  Verbalizes/displays adequate comfort level or baseline comfort level:   Encourage patient to monitor pain and request assistance   Administer analgesics based on type and severity of pain and evaluate response   Consider cultural and social influences on pain and pain management   Assess pain using appropriate pain scale   Implement non-pharmacological measures as appropriate and evaluate response   Notify Licensed Independent Practitioner if interventions unsuccessful or patient reports new pain     Problem: Safety - Adult  Goal: Free from fall injury  Outcome: Progressing  Flowsheets (Taken 5/16/2025 2330)  Free From Fall Injury: Instruct family/caregiver on patient safety

## 2025-05-17 NOTE — PROGRESS NOTES
Care  assisting Domenica Reed RN Care Mgr with Discharge Planning needs for patient.  Updates sent to [facility] (accepted) for rehab.    Will follow for further needs.

## 2025-05-17 NOTE — PROGRESS NOTES
Progress Note        Patient: Jaxon Barraza MRN: [id number]  SSN: [id number]    YOB: 1960  Age: 64 y.o.  Sex: male      * No surgery found * status post     Admit Date: 2025  Admit Diagnosis: Closed nondisplaced fracture of greater trochanter of right femur, initial encounter ([facility]) [S72.114A]  Closed disp intertrochanteric fracture of right femur with malunion [S72.141P]    Subjective:      Doing well.  No complaints.  No SOB.  No Chest Pain.  No Nausea or Vomiting.  No problems eating or voiding.    Objective:        Temp (24hrs), Av °F (36.7 °C), Min:97.8 °F (36.6 °C), Max:98.2 °F (36.8 °C)    Body mass index is 21.77 kg/m².  Patient Vitals for the past 12 hrs:   BP Temp Temp src Pulse Resp SpO2   25 0419 110/70 98.2 °F (36.8 °C) Oral 70 18 98 %   25 0112 -- -- -- -- 18 --   25 0042 -- -- -- -- 18 --   25 2345 106/73 98.2 °F (36.8 °C) Oral 70 17 96 %     Recent Labs     25  0255   HGB 13.0   HCT 38.7      K 3.8      CO2 22   BUN 14       Physical Exam:  Vital Signs are Stable.   No Acute Distress.    Alert and Oriented.    Negative Homans’ sign.    Toes AROM Full.    Neurovascular exam is normal.    Dressing is Clean, Dry, and Intact.    Assessment/Plan:     Non displaced intertroch fx. Conservative treatment being pursued. Has been able to ambulate with walker to bathroom.    Continue PT/OT  Discharge Plan: Rehab    Signed By: JESS LR MD     May 17, 2025

## 2025-05-17 NOTE — PROGRESS NOTES
Bedside and Verbal shift change report given to MAX Suarez (oncoming nurse) by MAX Johnson (offgoing nurse). Report included the following information Nurse Handoff Report, Index, Adult Overview, Surgery Report, Intake/Output, MAR, Recent Results, and Med Rec Status.

## 2025-05-17 NOTE — PROGRESS NOTES
Talked with  Ed , he agrees to continue lamotrigine 100 mg twice a day and would like continue xarelto on dc. Ct scan results update to him and recommend to continue monitoring hh and see Dr Hanks after dc. He indicated verbal understanding.

## 2025-05-21 NOTE — PROGRESS NOTES
Physician Progress Note      PATIENT:               POPPY ALVES  CSN #:                  [id number]  :                       1960  ADMIT DATE:       2025 6:45 PM  DISCH DATE:        2025 2:28 PM  RESPONDING  PROVIDER #:        Nataliia Matute MD          QUERY TEXT:    Please clarify whether the right  femur  fracture documented  -    is   related to   osteopenia.    The clinical indicators include:  -   femur  xray-  \"  FINDINGS: 4 images of AP and lateral views of the   right femur demonstrate subtle  nondisplaced fracture of the right femoral intertrochanteric bone. Moderate    diffuse osteopenia.\"  Options provided:  -- Pathological fracture related to osteopenia  -- traumatic fracture  -- Pathological right  femur  fracture due to osteopenia following fall which   would not usually break a normal, healthy bone:  -- Other - I will add my own diagnosis  -- Disagree - Not applicable / Not valid  -- Disagree - Clinically unable to determine / Unknown  -- Refer to Clinical Documentation Reviewer    PROVIDER RESPONSE TEXT:    The patient has a pathological fracture related to osteopenia    Query created by: Smitha Barrett on 2025 7:01 AM      Electronically signed by:  Nataliia Matute MD 2025 6:56 PM

## 2025-07-16 ENCOUNTER — HOSPITAL ENCOUNTER (OUTPATIENT)
Facility: HOSPITAL | Age: 65
Setting detail: RECURRING SERIES
Discharge: HOME | End: 2025-07-19
Payer: MEDICARE

## 2025-07-16 PROCEDURE — 97530 THERAPEUTIC ACTIVITIES: CPT

## 2025-07-16 PROCEDURE — 97161 PT EVAL LOW COMPLEX 20 MIN: CPT

## 2025-07-16 PROCEDURE — 97535 SELF CARE MNGMENT TRAINING: CPT

## 2025-07-16 NOTE — PROGRESS NOTES
Ankle PF NT NT   Ankle DF 4+ 4+             5.  Pt will be able to perform SLS test for at least 10 seconds/leg to meet MCID and indicate a decreased fall risk.   Eval Status: L: 3 sec  R: 0 sec     Long Term Goals: To be accomplished in 16 treatments:    2.   Pt will be able to improve standing tolerance to 2 hours or greater in order to improve independence and functional mobility to return to work.    Eval Status: limited to no more than 1 hour secondary to pain     3.   Pt will have 5/5 Boy LE strength to return to goals of being able to run 1/4 mile.  Eval Status:   Hip Left (1-5) Right (1-5)   Hip Flexion 4+ 3+   Hip Extension NT NT   Hip ABD 4  (seated) 4 (seated)     Knee Left (1-5) Right (1-5)   Knee Flexion 4 4   Knee Extension 5 5   Ankle PF NT NT   Ankle DF 4+ 4+       4.   Patient will be able to safely negotiate a full flight of stairs with a step-through gait pattern while holding onto at least one handrail in order to return to normal with this functional activity and improve safety on stairs.  Eval status: step-to pattern w/use of UE    5.   Patient will be able to perform at least 14 reps of sit <> stands with good form/control during 30\" STS test to demonstrate improved LE strength and stability, thus reducing the patients risk of falls.   Eval Status:     No Physician signature required; Signature on POC no longer required for Medicare as of 1/1/25      SUBJECTIVE    Pain Level (0-10 scale): 0/10  []constant []intermittent []improving []worsening []no change since onset    Any medication changes, allergies to medications, adverse drug reactions, diagnosis change, or new procedure performed?: [x] No    [] Yes   Subjective functional status/changes:       Mechanism of Injury: fall down the stairs   Current symptoms/Complaints: 0/10 at best with rest; 5/10 at worst with hip flexion and over activity; pt reports they are unable to sleep through the night secondary to pain  Previous

## 2025-07-22 ENCOUNTER — HOSPITAL ENCOUNTER (OUTPATIENT)
Facility: HOSPITAL | Age: 65
Setting detail: RECURRING SERIES
Discharge: HOME | End: 2025-07-25
Payer: MEDICARE

## 2025-07-22 PROCEDURE — 97112 NEUROMUSCULAR REEDUCATION: CPT

## 2025-07-22 PROCEDURE — 97530 THERAPEUTIC ACTIVITIES: CPT

## 2025-07-22 PROCEDURE — 97110 THERAPEUTIC EXERCISES: CPT

## 2025-07-22 NOTE — PROGRESS NOTES
while holding onto at least one handrail in order to return to normal with this functional activity and improve safety on stairs.  Eval status: step-to pattern w/use of UE     5.   Patient will be able to perform at least 14 reps of sit <> stands with good form/control during 30\" STS test to demonstrate improved LE strength and stability, thus reducing the patients risk of falls.        Eval Status: 9 with use of UE    PLAN  Yes  Continue plan of care  []  Upgrade activities as tolerated  []  Discharge due to :  []  Other:    Jorge Caballero, PT    7/22/2025    7:51 AM    Future Appointments   Date Time Provider Department Center   7/24/2025  7:50 AM KinCarlos bragg H, PTA [facility]   7/31/2025  9:50 AM KinCarlos bragg H, PTA [facility]   8/1/2025  1:10 PM KinCarlos bragg H, PTA [facility]   8/5/2025  1:10 PM KinCarlos bragg H, PTA [facility]   8/7/2025  9:10 AM KinCarlos bragg H, PTA [facility]   8/12/2025  1:10 PM KinCarlos H, PTA [facility]   8/14/2025  9:10 AM Jorge Caballero, PT [facility]   8/19/2025  9:10 AM KinCarlos H, PTA [facility]   8/21/2025  9:10 AM Jorge Caballero, PT [facility]   8/26/2025  9:10 AM KinCarlos H, PTA [facility]   8/28/2025  9:10 AM Jorge Caballero, PT [facility]   9/2/2025  9:10 AM KinCarlos H, PTA [facility]   9/4/2025  9:10 AM Jorge Caballero, PT [facility]   9/9/2025  9:10 AM Kin, Carlos H, PTA [facility]

## 2025-07-24 ENCOUNTER — HOSPITAL ENCOUNTER (OUTPATIENT)
Facility: HOSPITAL | Age: 65
Setting detail: RECURRING SERIES
Discharge: HOME | End: 2025-07-27
Payer: MEDICARE

## 2025-07-24 PROCEDURE — 97530 THERAPEUTIC ACTIVITIES: CPT

## 2025-07-24 PROCEDURE — 97112 NEUROMUSCULAR REEDUCATION: CPT

## 2025-07-24 PROCEDURE — 97110 THERAPEUTIC EXERCISES: CPT

## 2025-07-24 NOTE — PROGRESS NOTES
benefit from therapy, as pt reports having misplaced their HEP. Pt will benefit from continued therapy to address unmet goals and to return to prior level of activity.       Patient will continue to benefit from skilled PT / OT services to modify and progress therapeutic interventions, analyze and address functional mobility deficits, analyze and address ROM deficits, analyze and address strength deficits, analyze and address soft tissue restrictions, analyze and cue for proper movement patterns, and analyze and modify for postural abnormalities to address functional deficits and attain remaining goals.    Progress toward goals / Updated goals:  []  See Progress Note/Recertification    Short Term Goals: To be accomplished in 8 treatments:  Patient will be independent and compliant with HEP to progress toward goals and restore functional mobility.   Eval Status: issued at [facility]  Current: Redistributed HEP as pt reports misplacing their copy 7/24/25     Patient will score 79/80 points or higher on Lower Extremity Functional Scale (LEFS) to meet MCID and show improvement with functional activities and ADL's.  Scoring:           MCID = 9 points                                              21-40 = moderate limitation                                      61-80 = minimal to no limitation                     0-20 = severe limitation                                      41-60 = mild to moderate limitation  Eval: 70/80 on LEFS      Patient will improve pain in right hip/leg to 3/10 at worst to improve functional activity tolerance and restore prior level of function.  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  Eval Status:   Hip Left (1-5) Right (1-5)   Hip Flexion 4+ 3+   Hip Extension NT NT   Hip ABD 4  (seated) 4 (seated)      Knee Left (1-5) Right (1-5)   Knee Flexion 4 4   Knee Extension 5 5   Ankle PF NT NT   Ankle DF 4+ 4+               5.  Pt

## 2025-07-31 ENCOUNTER — HOSPITAL ENCOUNTER (OUTPATIENT)
Facility: HOSPITAL | Age: 65
Setting detail: RECURRING SERIES
End: 2025-07-31
Payer: MEDICARE

## 2025-07-31 PROCEDURE — 97530 THERAPEUTIC ACTIVITIES: CPT

## 2025-07-31 PROCEDURE — 97112 NEUROMUSCULAR REEDUCATION: CPT

## 2025-07-31 PROCEDURE — 97110 THERAPEUTIC EXERCISES: CPT

## 2025-07-31 NOTE — PROGRESS NOTES
PHYSICAL / OCCUPATIONAL THERAPY - DAILY TREATMENT NOTE     Patient Name: Jaxon Barraza    Date: 2025    : 1960  Insurance: Payor: MEDICARE / Plan: MEDICARE PART A AND B / Product Type: *No Product type* /      Patient  verified Yes     Visit #   Current / Total 4 16   Time   In / Out 9:50 10:28   Pain   In / Out 0/10 0/10   Subjective Functional Status/Changes: \"I don't have any pain right now my      TREATMENT AREA =  Displaced intertrochanteric fracture of right femur, initial encounter for closed fracture (HCC)  Other specified enthesopathies of left lower limb, excluding foot  Pain in right hip  Pain in right leg    If an interpreting service is utilized for treatment of this patient, the contents of this document represent the material reviewed with the patient via the .     OBJECTIVE    Therapeutic Procedures:  Tx Min Billable or 1:1 Min (if diff from Tx Min) Procedure, Rationale, Specifics   18  43393 Therapeutic Exercise (timed):  increase ROM, strength, coordination, balance, and proprioception to improve patient's ability to progress to PLOF and address remaining functional goals. (see flow sheet as applicable)    Details if applicable:       8  67936 Neuromuscular Re-Education (timed):  improve balance, coordination, kinesthetic sense, posture, core stability and proprioception to improve patient's ability to develop conscious control of individual muscles and awareness of position of extremities in order to progress to PLOF and address remaining functional goals. (see flow sheet as applicable)    Details if applicable:     12  89684 Therapeutic Activity (timed):  use of dynamic activities replicating functional movements to increase ROM, strength, coordination, balance, and proprioception in order to improve patient's ability to progress to PLOF and address remaining functional goals.  (see flow sheet as applicable)     Details if applicable:       [id number] Self Care/Home

## 2025-08-01 ENCOUNTER — TELEPHONE (OUTPATIENT)
Facility: HOSPITAL | Age: 65
End: 2025-08-01

## 2025-08-01 ENCOUNTER — HOSPITAL ENCOUNTER (OUTPATIENT)
Facility: HOSPITAL | Age: 65
Setting detail: RECURRING SERIES
End: 2025-08-01
Payer: MEDICARE

## 2025-08-05 ENCOUNTER — HOSPITAL ENCOUNTER (OUTPATIENT)
Facility: HOSPITAL | Age: 65
Setting detail: RECURRING SERIES
Discharge: HOME | End: 2025-08-08
Payer: MEDICARE

## 2025-08-05 PROCEDURE — 97110 THERAPEUTIC EXERCISES: CPT

## 2025-08-05 PROCEDURE — 97530 THERAPEUTIC ACTIVITIES: CPT

## 2025-08-07 ENCOUNTER — HOSPITAL ENCOUNTER (OUTPATIENT)
Facility: HOSPITAL | Age: 65
Setting detail: RECURRING SERIES
Discharge: HOME | End: 2025-08-10
Payer: MEDICARE

## 2025-08-07 PROCEDURE — 97112 NEUROMUSCULAR REEDUCATION: CPT

## 2025-08-07 PROCEDURE — 97530 THERAPEUTIC ACTIVITIES: CPT

## 2025-08-07 PROCEDURE — 97110 THERAPEUTIC EXERCISES: CPT

## 2025-08-12 ENCOUNTER — HOSPITAL ENCOUNTER (OUTPATIENT)
Facility: HOSPITAL | Age: 65
Setting detail: RECURRING SERIES
Discharge: HOME | End: 2025-08-15
Payer: MEDICARE

## 2025-08-12 PROCEDURE — 97112 NEUROMUSCULAR REEDUCATION: CPT

## 2025-08-12 PROCEDURE — 97530 THERAPEUTIC ACTIVITIES: CPT

## 2025-08-12 PROCEDURE — 97110 THERAPEUTIC EXERCISES: CPT

## 2025-08-14 ENCOUNTER — HOSPITAL ENCOUNTER (OUTPATIENT)
Facility: HOSPITAL | Age: 65
Setting detail: RECURRING SERIES
Discharge: HOME | End: 2025-08-17
Payer: MEDICARE

## 2025-08-14 PROCEDURE — 97112 NEUROMUSCULAR REEDUCATION: CPT

## 2025-08-14 PROCEDURE — 97530 THERAPEUTIC ACTIVITIES: CPT

## 2025-08-19 ENCOUNTER — TELEPHONE (OUTPATIENT)
Facility: HOSPITAL | Age: 65
End: 2025-08-19

## 2025-08-21 ENCOUNTER — HOSPITAL ENCOUNTER (OUTPATIENT)
Facility: HOSPITAL | Age: 65
Setting detail: RECURRING SERIES
Discharge: HOME | End: 2025-08-24
Payer: MEDICARE

## 2025-08-21 PROCEDURE — 97110 THERAPEUTIC EXERCISES: CPT

## 2025-08-21 PROCEDURE — 97530 THERAPEUTIC ACTIVITIES: CPT

## 2025-08-21 PROCEDURE — 97112 NEUROMUSCULAR REEDUCATION: CPT

## 2025-08-26 ENCOUNTER — HOSPITAL ENCOUNTER (OUTPATIENT)
Facility: HOSPITAL | Age: 65
Setting detail: RECURRING SERIES
Discharge: HOME | End: 2025-08-29
Payer: MEDICARE

## 2025-08-26 PROCEDURE — 97530 THERAPEUTIC ACTIVITIES: CPT

## 2025-08-26 PROCEDURE — 97110 THERAPEUTIC EXERCISES: CPT

## 2025-08-26 PROCEDURE — 97112 NEUROMUSCULAR REEDUCATION: CPT

## 2025-08-28 ENCOUNTER — HOSPITAL ENCOUNTER (OUTPATIENT)
Facility: HOSPITAL | Age: 65
Setting detail: RECURRING SERIES
Discharge: HOME | End: 2025-08-31
Payer: MEDICARE

## 2025-08-28 PROCEDURE — 97110 THERAPEUTIC EXERCISES: CPT

## 2025-08-28 PROCEDURE — 97112 NEUROMUSCULAR REEDUCATION: CPT

## 2025-08-28 PROCEDURE — 97530 THERAPEUTIC ACTIVITIES: CPT

## 2025-09-02 ENCOUNTER — HOSPITAL ENCOUNTER (OUTPATIENT)
Facility: HOSPITAL | Age: 65
Setting detail: RECURRING SERIES
Discharge: HOME | End: 2025-09-05
Payer: MEDICARE

## 2025-09-02 PROCEDURE — 97110 THERAPEUTIC EXERCISES: CPT

## 2025-09-02 PROCEDURE — 97530 THERAPEUTIC ACTIVITIES: CPT

## 2025-09-02 PROCEDURE — 97112 NEUROMUSCULAR REEDUCATION: CPT
